# Patient Record
Sex: FEMALE | Race: WHITE | Employment: OTHER | URBAN - METROPOLITAN AREA
[De-identification: names, ages, dates, MRNs, and addresses within clinical notes are randomized per-mention and may not be internally consistent; named-entity substitution may affect disease eponyms.]

---

## 2017-01-19 DIAGNOSIS — I10 ESSENTIAL HYPERTENSION WITH GOAL BLOOD PRESSURE LESS THAN 140/90: Primary | ICD-10-CM

## 2017-01-19 DIAGNOSIS — F41.1 GENERALIZED ANXIETY DISORDER: ICD-10-CM

## 2017-01-19 RX ORDER — VALSARTAN AND HYDROCHLOROTHIAZIDE 160; 25 MG/1; MG/1
1 TABLET ORAL DAILY
Qty: 90 TABLET | Refills: 1 | Status: SHIPPED | OUTPATIENT
Start: 2017-01-19 | End: 2017-07-22

## 2017-01-19 RX ORDER — PAROXETINE 10 MG/1
10 TABLET, FILM COATED ORAL AT BEDTIME
Qty: 90 TABLET | Refills: 1 | Status: SHIPPED | OUTPATIENT
Start: 2017-01-19 | End: 2017-07-22

## 2017-01-19 RX ORDER — AMLODIPINE BESYLATE 5 MG/1
5 TABLET ORAL DAILY
Qty: 90 TABLET | Refills: 1 | Status: SHIPPED | OUTPATIENT
Start: 2017-01-19 | End: 2017-07-22

## 2017-01-20 DIAGNOSIS — F41.1 GENERALIZED ANXIETY DISORDER: Primary | ICD-10-CM

## 2017-02-02 ENCOUNTER — RADIANT APPOINTMENT (OUTPATIENT)
Dept: BONE DENSITY | Facility: CLINIC | Age: 72
End: 2017-02-02
Attending: FAMILY MEDICINE
Payer: COMMERCIAL

## 2017-02-02 DIAGNOSIS — Z13.820 SCREENING FOR OSTEOPOROSIS: ICD-10-CM

## 2017-02-02 PROCEDURE — 77080 DXA BONE DENSITY AXIAL: CPT | Performed by: INTERNAL MEDICINE

## 2017-02-08 ENCOUNTER — TRANSFERRED RECORDS (OUTPATIENT)
Dept: HEALTH INFORMATION MANAGEMENT | Facility: CLINIC | Age: 72
End: 2017-02-08

## 2017-03-28 ENCOUNTER — TELEPHONE (OUTPATIENT)
Dept: FAMILY MEDICINE | Facility: CLINIC | Age: 72
End: 2017-03-28

## 2017-03-28 NOTE — TELEPHONE ENCOUNTER
Panel Management Review      Patient has the following on her problem list: None      Composite cancer screening  Chart review shows that this patient is due/due soon for the following Mammogram  Summary:    Patient is due/failing the following:   BMP  and MAMMOGRAM    Action needed:   Patient needs office visit for HTN and mammogram .    Type of outreach:    [employer] message.    Questions for provider review:    None                                                                                                                                    Constance Boyd [facility]       Chart routed to Care Team .

## 2017-03-31 DIAGNOSIS — F41.1 GENERALIZED ANXIETY DISORDER: ICD-10-CM

## 2017-04-03 NOTE — TELEPHONE ENCOUNTER
Controlled Substance Refill Request for xanax  Problem List Complete:  No     PROVIDER TO CONSIDER COMPLETION OF PROBLEM LIST AND OVERVIEW/CONTROLLED SUBSTANCE AGREEMENT    Last Written Prescription Date:  7/12/16  Last Fill Quantity: 90,   # refills: 1    Last Office Visit with [place] primary care provider: 7/12/16    Future Office visit: none    Controlled substance agreement on file: No.      checked in past 6 months?  Yes 4/3/17 no concerns     Valery Guillen RN

## 2017-04-05 RX ORDER — ALPRAZOLAM 0.25 MG
TABLET ORAL
Qty: 90 TABLET | Refills: 1 | Status: SHIPPED | OUTPATIENT
Start: 2017-04-05 | End: 2018-07-05

## 2017-07-22 DIAGNOSIS — F41.1 GENERALIZED ANXIETY DISORDER: ICD-10-CM

## 2017-07-22 DIAGNOSIS — I10 ESSENTIAL HYPERTENSION WITH GOAL BLOOD PRESSURE LESS THAN 140/90: ICD-10-CM

## 2017-07-24 RX ORDER — VALSARTAN AND HYDROCHLOROTHIAZIDE 160; 25 MG/1; MG/1
TABLET ORAL
Qty: 30 TABLET | Refills: 0 | Status: SHIPPED | OUTPATIENT
Start: 2017-07-24 | End: 2017-08-24

## 2017-07-24 RX ORDER — PAROXETINE 10 MG/1
TABLET, FILM COATED ORAL
Qty: 30 TABLET | Refills: 0 | Status: SHIPPED | OUTPATIENT
Start: 2017-07-24 | End: 2017-08-24

## 2017-07-24 RX ORDER — AMLODIPINE BESYLATE 5 MG/1
TABLET ORAL
Qty: 30 TABLET | Refills: 0 | Status: SHIPPED | OUTPATIENT
Start: 2017-07-24 | End: 2017-08-24

## 2017-07-24 NOTE — TELEPHONE ENCOUNTER
Medication is being filled for 1 time refill only due to:  Patient needs to be seen because it has been more than one year since last visit.   Philomena Maria RN

## 2017-08-22 NOTE — PROGRESS NOTES
SUBJECTIVE:   Jenifer Baker is a 72 year old female who presents to clinic today for the following health issues:      Hypertension Follow-up      Outpatient blood pressures rarely , but it is all over the place     Low Salt Diet: no added salt    Hypertension ROS: taking medications as instructed, no medication side effects noted, no TIA's, no chest pain on exertion, no dyspnea on exertion, no swelling of ankles.  No headache or visual changes.     Anxiety Follow-Up    Status since last visit: No change    Other associated symptoms:struggles sleeping     Complicating factors:   Significant life event: No   Current substance abuse: None  Depression symptoms: No  ROSALVA-7 SCORE 4/28/2015 5/12/2015 8/24/2017   Total Score 7 3 -   Total Score - - 3       GAD7          Amount of exercise or physical activity: active     Problems taking medications regularly: No    Medication side effects: none  Diet: low salt          Problem list and histories reviewed & adjusted, as indicated.  Additional history: as documented    Patient Active Problem List   Diagnosis     Health Care Home     Advanced directives, counseling/discussion     Primary localized osteoarthrosis of lower leg     Generalized anxiety disorder     Hypertension goal BP (blood pressure) < 140/90     CARDIOVASCULAR SCREENING; LDL GOAL LESS THAN 160     Past Surgical History:   Procedure Laterality Date     APPENDECTOMY  1973     CHOLECYSTECTOMY  1973     COLONOSCOPY  2015     GALLBLADDER SURGERY       ORTHOPEDIC SURGERY  Knee replacement    Nov. 2015     TUBAL LIGATION         Social History   Substance Use Topics     Smoking status: Never Smoker     Smokeless tobacco: Never Used     Alcohol use Yes      Comment: Wine...on occasion     Family History   Problem Relation Age of Onset     CANCER Mother 89     Utreine cancer     Hypertension Mother      CEREBROVASCULAR DISEASE Mother      Other Cancer Mother      Depression Mother      Anxiety Disorder Mother       Arthritis Father      CANCER Father      Lung cancer     Other Cancer Father          Current Outpatient Prescriptions   Medication Sig Dispense Refill     amLODIPine (NORVASC) 5 MG tablet Take 1 tablet (5 mg) by mouth daily 90 tablet 1     PARoxetine (PAXIL) 10 MG tablet Take 1 tablet (10 mg) by mouth At Bedtime 90 tablet 1     valsartan-hydrochlorothiazide (DIOVAN-HCT) 160-25 MG per tablet Take 1 tablet by mouth daily 90 tablet 1     ALPRAZolam (XANAX) 0.25 MG tablet TAKE 1 TABLET BY MOUTH 3 TIMES DAILY AS NEEDED 90 tablet 1     multivitamin, therapeutic with minerals (MULTI-VITAMIN) TABS Take 1 tablet by mouth daily       vitamin E 400 UNIT capsule Take by mouth daily       Glucosamine-Chondroit-Vit C-Mn (GLUCOSAMINE CHONDR 1500 COMPLX PO) Take 1 tablet by mouth 2 times daily       Vitamin D, Cholecalciferol, 1000 UNITS CAPS Take 1 capsule by mouth daily       aspirin 81 MG tablet Take by mouth daily       Calcium-Magnesium-Vitamin D 600-300-100 LIQD        B Complex Vitamins (B COMPLEX PO) Take by mouth daily       omega-3 fatty acids (FISH OIL) 1200 MG capsule Take 1 capsule by mouth daily       Recent Labs   Lab Test  08/24/17   0943  07/12/16   1235   08/16/13   1034  03/15/11   LDL   --    --    --   129   --   115   HDL   --    --    --   69   --   64   TRIG   --    --    --   79   --   82   ALT   --    --    --   26   --    --    CR  0.80  0.68   < >  0.72   < >   --    GFRESTIMATED  71  85   < >  81   < >  >60   GFRESTBLACK  85  >90   GFR Calc     < >  >90   < >  >60   POTASSIUM  3.8  3.6   < >  3.8   < >  4.5   TSH  3.20   --    --   2.43   < >   --     < > = values in this interval not displayed.      BP Readings from Last 3 Encounters:   08/24/17 126/73   07/12/16 124/74   11/10/15 124/62    Wt Readings from Last 3 Encounters:   08/24/17 158 lb (71.7 kg)   07/12/16 159 lb (72.1 kg)   11/10/15 161 lb (73 kg)                          Reviewed and updated as needed this visit by clinical  "[facility]  Allergies  Meds  Problems       Reviewed and updated as needed this visit by Provider  Allergies  Meds  Problems         ROS:  Constitutional, HEENT, cardiovascular, pulmonary, gi and gu systems are negative, except as otherwise noted.      OBJECTIVE:   /73  Pulse 78  Temp 97.8  F (36.6  C) (Oral)  Ht 5' 3.5\" (1.613 m)  Wt 158 lb (71.7 kg)  BMI 27.55 kg/m2  Body mass index is 27.55 kg/(m^2).  GENERAL: healthy, alert and no distress  EYES: Eyes grossly normal to inspection, PERRL and conjunctivae and sclerae normal  NECK: no adenopathy, no asymmetry, masses, or scars and thyroid normal to palpation  RESP: lungs clear to auscultation - no rales, rhonchi or wheezes  CV: regular rate and rhythm, normal S1 S2, no S3 or S4, no murmur, click or rub, no peripheral edema and peripheral pulses strong  MS: no gross musculoskeletal defects noted, no edema  SKIN: no suspicious lesions or rashes  PSYCH: mentation appears normal, affect normal/bright    Diagnostic Test Results:  none     ASSESSMENT/PLAN:     (I10) Essential hypertension with goal blood pressure less than 140/90  (primary encounter diagnosis)  Comment: to goal  Plan: Basic metabolic panel  (Ca, Cl, CO2, Creat,         Gluc, K, Na, BUN), amLODIPine (NORVASC) 5 MG         tablet, valsartan-hydrochlorothiazide         (DIOVAN-HCT) 160-25 MG per tablet         Refill x 6 months f/u 6 months for OV and labs for wellness exam    (F41.1) Generalized anxiety disorder  Comment: doing well  Plan: PARoxetine (PAXIL) 10 MG tablet         Refill x 6 months see above for f/u     (R53.83) Fatigue, unspecified type  Comment: unclear etiology  Plan: Vitamin D Deficiency, Hemoglobin, TSH with free        T4 reflex        Recheck labs, treat if indicated    (Z23) Need for Tdap vaccination  Comment: due  Plan: TDAP VACCINE (ADACEL), ADMIN 1st VACCINE            (Z12.31) Encounter for screening mammogram for breast cancer  Comment: due  Plan: *MA " Screening Digital Bilateral              See Patient Instructions    Cira Schwab MD  [facility]

## 2017-08-24 ENCOUNTER — OFFICE VISIT (OUTPATIENT)
Dept: FAMILY MEDICINE | Facility: CLINIC | Age: 72
End: 2017-08-24
Payer: COMMERCIAL

## 2017-08-24 VITALS
HEART RATE: 78 BPM | BODY MASS INDEX: 26.98 KG/M2 | TEMPERATURE: 97.8 F | SYSTOLIC BLOOD PRESSURE: 126 MMHG | WEIGHT: 158 LBS | HEIGHT: 64 IN | DIASTOLIC BLOOD PRESSURE: 73 MMHG

## 2017-08-24 DIAGNOSIS — I10 ESSENTIAL HYPERTENSION WITH GOAL BLOOD PRESSURE LESS THAN 140/90: Primary | ICD-10-CM

## 2017-08-24 DIAGNOSIS — F41.1 GENERALIZED ANXIETY DISORDER: ICD-10-CM

## 2017-08-24 DIAGNOSIS — R53.83 FATIGUE, UNSPECIFIED TYPE: ICD-10-CM

## 2017-08-24 DIAGNOSIS — Z23 NEED FOR TDAP VACCINATION: ICD-10-CM

## 2017-08-24 DIAGNOSIS — Z12.31 ENCOUNTER FOR SCREENING MAMMOGRAM FOR BREAST CANCER: ICD-10-CM

## 2017-08-24 LAB
ANION GAP SERPL CALCULATED.3IONS-SCNC: 7 MMOL/L (ref 3–14)
BUN SERPL-MCNC: 13 MG/DL (ref 7–30)
CALCIUM SERPL-MCNC: 9.2 MG/DL (ref 8.5–10.1)
CHLORIDE SERPL-SCNC: 102 MMOL/L (ref 94–109)
CO2 SERPL-SCNC: 30 MMOL/L (ref 20–32)
CREAT SERPL-MCNC: 0.8 MG/DL (ref 0.52–1.04)
DEPRECATED CALCIDIOL+CALCIFEROL SERPL-MC: 38 UG/L (ref 20–75)
GFR SERPL CREATININE-BSD FRML MDRD: 71 ML/MIN/1.7M2
GLUCOSE SERPL-MCNC: 92 MG/DL (ref 70–99)
HGB BLD-MCNC: 13.2 G/DL (ref 11.7–15.7)
POTASSIUM SERPL-SCNC: 3.8 MMOL/L (ref 3.4–5.3)
SODIUM SERPL-SCNC: 139 MMOL/L (ref 133–144)
TSH SERPL DL<=0.005 MIU/L-ACNC: 3.2 MU/L (ref 0.4–4)

## 2017-08-24 PROCEDURE — 82306 VITAMIN D 25 HYDROXY: CPT | Performed by: FAMILY MEDICINE

## 2017-08-24 PROCEDURE — 99214 OFFICE O/P EST MOD 30 MIN: CPT | Mod: 25 | Performed by: FAMILY MEDICINE

## 2017-08-24 PROCEDURE — 84443 ASSAY THYROID STIM HORMONE: CPT | Performed by: FAMILY MEDICINE

## 2017-08-24 PROCEDURE — 90715 TDAP VACCINE 7 YRS/> IM: CPT | Performed by: FAMILY MEDICINE

## 2017-08-24 PROCEDURE — 85018 HEMOGLOBIN: CPT | Performed by: FAMILY MEDICINE

## 2017-08-24 PROCEDURE — 36415 COLL VENOUS BLD VENIPUNCTURE: CPT | Performed by: FAMILY MEDICINE

## 2017-08-24 PROCEDURE — 90471 IMMUNIZATION ADMIN: CPT | Performed by: FAMILY MEDICINE

## 2017-08-24 PROCEDURE — 80048 BASIC METABOLIC PNL TOTAL CA: CPT | Performed by: FAMILY MEDICINE

## 2017-08-24 RX ORDER — VALSARTAN AND HYDROCHLOROTHIAZIDE 160; 25 MG/1; MG/1
1 TABLET ORAL DAILY
Qty: 90 TABLET | Refills: 1 | Status: SHIPPED | OUTPATIENT
Start: 2017-08-24 | End: 2018-02-25

## 2017-08-24 RX ORDER — AMLODIPINE BESYLATE 5 MG/1
5 TABLET ORAL DAILY
Qty: 90 TABLET | Refills: 1 | Status: SHIPPED | OUTPATIENT
Start: 2017-08-24 | End: 2018-02-25

## 2017-08-24 RX ORDER — PAROXETINE 10 MG/1
10 TABLET, FILM COATED ORAL AT BEDTIME
Qty: 90 TABLET | Refills: 1 | Status: SHIPPED | OUTPATIENT
Start: 2017-08-24 | End: 2018-02-25

## 2017-08-24 ASSESSMENT — ANXIETY QUESTIONNAIRES
2. NOT BEING ABLE TO STOP OR CONTROL WORRYING: NOT AT ALL
3. WORRYING TOO MUCH ABOUT DIFFERENT THINGS: SEVERAL DAYS
7. FEELING AFRAID AS IF SOMETHING AWFUL MIGHT HAPPEN: NOT AT ALL
1. FEELING NERVOUS, ANXIOUS, OR ON EDGE: SEVERAL DAYS
GAD7 TOTAL SCORE: 3
6. BECOMING EASILY ANNOYED OR IRRITABLE: SEVERAL DAYS
IF YOU CHECKED OFF ANY PROBLEMS ON THIS QUESTIONNAIRE, HOW DIFFICULT HAVE THESE PROBLEMS MADE IT FOR YOU TO DO YOUR WORK, TAKE CARE OF THINGS AT HOME, OR GET ALONG WITH OTHER PEOPLE: NOT DIFFICULT AT ALL
5. BEING SO RESTLESS THAT IT IS HARD TO SIT STILL: NOT AT ALL

## 2017-08-24 ASSESSMENT — PATIENT HEALTH QUESTIONNAIRE - PHQ9: 5. POOR APPETITE OR OVEREATING: NOT AT ALL

## 2017-08-24 NOTE — NURSING NOTE
"Chief Complaint   Patient presents with     Hypertension     Anxiety       Initial /73  Pulse 78  Temp 97.8  F (36.6  C) (Oral)  Ht 5' 3.5\" (1.613 m)  Wt 158 lb (71.7 kg)  BMI 27.55 kg/m2 Estimated body mass index is 27.55 kg/(m^2) as calculated from the following:    Height as of this encounter: 5' 3.5\" (1.613 m).    Weight as of this encounter: 158 lb (71.7 kg).  Medication Reconciliation: complete  Constance Boyd , WALI     "

## 2017-08-24 NOTE — MR AVS SNAPSHOT
After Visit Summary   8/24/2017    Jenifer Baker    MRN: [id number]           Patient Information     Date Of Birth          1945        Visit Information        Provider Department      8/24/2017 8:55 AM Cira Schwab MD [facility]        Today's Diagnoses     Essential hypertension with goal blood pressure less than 140/90    -  1    Generalized anxiety disorder        Fatigue, unspecified type        Need for Tdap vaccination        Encounter for screening mammogram for breast cancer          Care Instructions      Next visit in 6 months for wellness exam and labs/refills.      Don't forget to get a flu shot.          Follow-ups after your visit        Future tests that were ordered for you today     Open Future Orders        Priority Expected Expires Ordered    *MA Screening Digital Bilateral Routine  8/22/2018 8/24/2017            Who to contact     If you have questions or need follow up information about today's clinic visit or your schedule please contact [facility] directly at [phone].  Normal or non-critical lab and imaging results will be communicated to you by MyChart, letter or phone within 4 business days after the clinic has received the results. If you do not hear from us within 7 days, please contact the clinic through [employer] or phone. If you have a critical or abnormal lab result, we will notify you by phone as soon as possible.  Submit refill requests through [employer] or call your pharmacy and they will forward the refill request to us. Please allow 3 business days for your refill to be completed.          Additional Information About Your Visit        MyChart Information     [employer] gives you secure access to your electronic health record. If you see a primary care provider, you can also send messages to your care team and make appointments. If you have questions, please call your primary care clinic.  If you do not have a primary care  "provider, please call [phone] and they will assist you.        Care EveryWhere ID     This is your Care EveryWhere ID. This could be used by other organizations to access your [place] medical records  [id number]        Your Vitals Were     Pulse Temperature Height BMI (Body Mass Index)          78 97.8  F (36.6  C) (Oral) 5' 3.5\" (1.613 m) 27.55 kg/m2         Blood Pressure from Last 3 Encounters:   08/24/17 126/73   07/12/16 124/74   11/10/15 124/62    Weight from Last 3 Encounters:   08/24/17 158 lb (71.7 kg)   07/12/16 159 lb (72.1 kg)   11/10/15 161 lb (73 kg)              We Performed the Following     ADMIN 1st VACCINE     Basic metabolic panel  (Ca, Cl, CO2, Creat, Gluc, K, Na, BUN)     Hemoglobin     TDAP VACCINE (ADACEL)     TSH with free T4 reflex     Vitamin D Deficiency          Today's Medication Changes          These changes are accurate as of: 8/24/17  9:36 AM.  If you have any questions, ask your nurse or doctor.               These medicines have changed or have updated prescriptions.        Dose/Directions    amLODIPine 5 MG tablet   Commonly known as:  NORVASC   This may have changed:  See the new instructions.   Used for:  Essential hypertension with goal blood pressure less than 140/90   Changed by:  Cira Schwab MD        Dose:  5 mg   Take 1 tablet (5 mg) by mouth daily   Quantity:  90 tablet   Refills:  1       PARoxetine 10 MG tablet   Commonly known as:  PAXIL   This may have changed:  See the new instructions.   Used for:  Generalized anxiety disorder   Changed by:  Cira Schwab MD        Dose:  10 mg   Take 1 tablet (10 mg) by mouth At Bedtime   Quantity:  90 tablet   Refills:  1       valsartan-hydrochlorothiazide 160-25 MG per tablet   Commonly known as:  DIOVAN-HCT   This may have changed:  See the new instructions.   Used for:  Essential hypertension with goal blood pressure less than 140/90   Changed by:  Cira Schwab MD        Dose:  1 tablet "   Take 1 tablet by mouth daily   Quantity:  90 tablet   Refills:  1            Where to get your medicines      These medications were sent to [facility]/pharmacy #[id number] - [place], [place] - [address],  [address] [facility]  [address], , [facility] [address]     Phone:  [phone]     amLODIPine 5 MG tablet    PARoxetine 10 MG tablet    valsartan-hydrochlorothiazide 160-25 MG per tablet                Primary Care Provider Office Phone # Fax #    Cira Schwab [phone] [address] [address]        Equal Access to Services     [facility]: Hadii aad martita hadasho Soomaali, waaxda luqadaha, qaybta kaalmada adeegyada, waxlai johnson haydwayne diaz . So [facility] [phone].    ATENCIÓN: Si habla español, tiene a vieira disposición servicios gratuitos de asistencia lingüística. [facility] [phone].    We comply with applicable federal civil rights laws and [place] laws. We do not discriminate on the basis of race, color, national origin, age, disability sex, sexual orientation or gender identity.            Thank you!     Thank you for choosing [facility]  for your care. Our goal is always to provide you with excellent care. Hearing back from our patients is one way we can continue to improve our services. Please take a few minutes to complete the written survey that you may receive in the mail after your visit with us. Thank you!             Your Updated Medication List - Protect others around you: Learn how to safely use, store and throw away your medicines at www.disposemymeds.org.          This list is accurate as of: 8/24/17  9:36 AM.  Always use your most recent med list.                   Brand Name Dispense Instructions for use Diagnosis    ALPRAZolam 0.25 MG tablet    XANAX    90 tablet    TAKE 1 TABLET BY MOUTH 3 TIMES DAILY AS NEEDED    Generalized anxiety disorder       amLODIPine 5 MG tablet    NORVASC    90  tablet    Take 1 tablet (5 mg) by mouth daily    Essential hypertension with goal blood pressure less than 140/90       aspirin 81 MG tablet      Take by mouth daily    Irregular heart rhythm       B COMPLEX PO      Take by mouth daily    Irregular heart rhythm       Calcium-Magnesium-Vitamin D 600-300-100 Liqd       Vitamin D insufficiency       GLUCOSAMINE CHONDR 1500 COMPLX PO      Take 1 tablet by mouth 2 times daily        Multi-vitamin Tabs tablet      Take 1 tablet by mouth daily        omega-3 fatty acids 1200 MG capsule      Take 1 capsule by mouth daily    CARDIOVASCULAR SCREENING; LDL GOAL LESS THAN 160       PARoxetine 10 MG tablet    PAXIL    90 tablet    Take 1 tablet (10 mg) by mouth At Bedtime    Generalized anxiety disorder       valsartan-hydrochlorothiazide 160-25 MG per tablet    DIOVAN-HCT    90 tablet    Take 1 tablet by mouth daily    Essential hypertension with goal blood pressure less than 140/90       Vitamin D (Cholecalciferol) 1000 UNITS Caps      Take 1 capsule by mouth daily        vitamin E 400 UNIT capsule      Take by mouth daily

## 2017-08-25 ASSESSMENT — ANXIETY QUESTIONNAIRES: GAD7 TOTAL SCORE: 3

## 2017-09-22 ENCOUNTER — RADIANT APPOINTMENT (OUTPATIENT)
Dept: MAMMOGRAPHY | Facility: CLINIC | Age: 72
End: 2017-09-22
Attending: FAMILY MEDICINE
Payer: COMMERCIAL

## 2017-09-22 DIAGNOSIS — Z12.31 ENCOUNTER FOR SCREENING MAMMOGRAM FOR BREAST CANCER: ICD-10-CM

## 2017-09-22 PROCEDURE — G0202 SCR MAMMO BI INCL CAD: HCPCS | Mod: TC

## 2018-02-25 DIAGNOSIS — I10 ESSENTIAL HYPERTENSION WITH GOAL BLOOD PRESSURE LESS THAN 140/90: ICD-10-CM

## 2018-02-25 DIAGNOSIS — F41.1 GENERALIZED ANXIETY DISORDER: ICD-10-CM

## 2018-02-26 RX ORDER — AMLODIPINE BESYLATE 5 MG/1
TABLET ORAL
Qty: 90 TABLET | Refills: 1 | Status: SHIPPED | OUTPATIENT
Start: 2018-02-26 | End: 2018-09-01

## 2018-02-26 RX ORDER — PAROXETINE 10 MG/1
TABLET, FILM COATED ORAL
Qty: 90 TABLET | Refills: 1 | Status: SHIPPED | OUTPATIENT
Start: 2018-02-26 | End: 2018-09-01

## 2018-02-26 RX ORDER — VALSARTAN AND HYDROCHLOROTHIAZIDE 160; 25 MG/1; MG/1
TABLET ORAL
Qty: 90 TABLET | Refills: 1 | Status: SHIPPED | OUTPATIENT
Start: 2018-02-26 | End: 2018-08-11

## 2018-04-25 ENCOUNTER — TRANSFERRED RECORDS (OUTPATIENT)
Dept: HEALTH INFORMATION MANAGEMENT | Facility: CLINIC | Age: 73
End: 2018-04-25

## 2018-06-26 NOTE — PROGRESS NOTES
[facility]  [address] [address] [phone]  Dept: [phone]    PRE-OP EVALUATION:  Today's date: 7/3/2018    Jenifer Baker (: 1945) presents for pre-operative evaluation assessment as requested by Dr. Adorno.  She requires evaluation and anesthesia risk assessment prior to undergoing surgery/procedure for treatment of Degenerative arthritis  .    Fax number for surgical facility:   Primary Physician: Cira Schwab  Type of Anesthesia Anticipated: General    Patient has a Health Care Directive or Living Will:  YES     Preop Questions 2018   Who is doing your surgery? Kyree...ISAIO   What are you having done? knee replacement   Date of Surgery/Procedure: 18   Facility or Hospital where procedure/surgery will be performed: [facility]   1.  Do you have a history of Heart attack, stroke, stent, coronary bypass surgery, or other heart surgery? No   2.  Do you ever have any pain or discomfort in your chest? No   3.  Do you have a history of  Heart Failure? No   4.   Are you troubled by shortness of breath when:  walking on a level surface, or up a slight hill, or at night? No   5.  Do you currently have a cold, bronchitis or other respiratory infection? No   6.  Do you have a cough, shortness of breath, or wheezing? No   7.  Do you sometimes get pains in the calves of your legs when you walk? UNKNOWN - because of arthritis in left knee   8. Do you or anyone in your family have previous history of blood clots? No   9.  Do you or does anyone in your family have a serious bleeding problem such as prolonged bleeding following surgeries or cuts? No   10. Have you ever had problems with anemia or been told to take iron pills? No   11. Have you had any abnormal blood loss such as black, tarry or bloody stools, or abnormal vaginal bleeding? No   12. Have you ever had a blood transfusion? No   13. Have you or any of your relatives ever had problems with  anesthesia? No   14. Do you have sleep apnea, excessive snoring or daytime drowsiness? No   15. Do you have any prosthetic heart valves? No   16. Do you have prosthetic joints? YES - right TKA   17. Is there any chance that you may be pregnant? No         HPI:     HPI related to upcoming procedure: severe OA of left knee requiring TKA      See problem list for active medical problems.  Problems all longstanding and stable, except as noted/documented.  See ROS for pertinent symptoms related to these conditions.                                                                                                                                                          .  HYPERTENSION - Patient has longstanding history of HTN , currently denies any symptoms referable to elevated blood pressure. Specifically denies chest pain, palpitations, dyspnea, orthopnea, PND or peripheral edema. Blood pressure readings have been in normal range. Current medication regimen is as listed below. Patient denies any side effects of medication.                                                                                                                                                                                          .    MEDICAL HISTORY:     Patient Active Problem List    Diagnosis Date Noted     Advanced directives, counseling/discussion 08/15/2013     Priority: Medium     Advance Care Planning: Pt does not have ADV, Was giving more info. About ADV. Farzana Yanes MA             Primary localized osteoarthrosis of lower leg 08/15/2013     Priority: Medium     Diagnosis updated by automated process. Provider to review and confirm.       Generalized anxiety disorder 08/15/2013     Priority: Medium     Diagnosis updated by automated process. Provider to review and confirm.       Hypertension goal BP (blood pressure) < 140/90 08/15/2013     Priority: Medium     CARDIOVASCULAR SCREENING; LDL GOAL LESS THAN 160 08/15/2013     Priority: Medium      [facility] 02/12/2013     Priority: Ann Beyer) HENNY Cervantes,C--[phone]   A / [facility] Care Coordinator /                    DX V65.8 REPLACED WITH [address] [facility] CARE HOME (04/08/2013)        Past Medical History:   Diagnosis Date     Arthritis      Depressive disorder      Hypertension      NO ACTIVE PROBLEMS      Past Surgical History:   Procedure Laterality Date     APPENDECTOMY  1973     CHOLECYSTECTOMY  1973     COLONOSCOPY  2015     GALLBLADDER SURGERY       ORTHOPEDIC SURGERY  Knee replacement    Nov. 2015     TUBAL LIGATION       Current Outpatient Prescriptions   Medication Sig Dispense Refill     ALPRAZolam (XANAX) 0.25 MG tablet TAKE 1 TABLET BY MOUTH 3 TIMES DAILY AS NEEDED 90 tablet 1     amLODIPine (NORVASC) 5 MG tablet TAKE 1 TABLET (5 MG) BY MOUTH DAILY 90 tablet 1     aspirin 81 MG tablet Take by mouth daily       Calcium-Magnesium-Vitamin D 600-300-100 LIQD        multivitamin, therapeutic with minerals (MULTI-VITAMIN) TABS Take 1 tablet by mouth daily       NONFORMULARY 2 times daily Hemp oil       omega-3 fatty acids (FISH OIL) 1200 MG capsule Take 1 capsule by mouth daily       PARoxetine (PAXIL) 10 MG tablet TAKE 1 TABLET (10 MG) BY MOUTH AT BEDTIME 90 tablet 1     valsartan-hydrochlorothiazide (DIOVAN-HCT) 160-25 MG per tablet TAKE 1 TABLET BY MOUTH DAILY 90 tablet 1     Vitamin D, Cholecalciferol, 1000 UNITS CAPS Take 1 capsule by mouth daily       vitamin E 400 UNIT capsule Take by mouth daily       OTC products: None, except as noted above    No Known Allergies   Latex Allergy: NO    Social History   Substance Use Topics     Smoking status: Never Smoker     Smokeless tobacco: Never Used     Alcohol use Yes      Comment: Wine...on occasion     History   Drug Use No       REVIEW OF SYSTEMS:   Constitutional, neuro, ENT, endocrine, pulmonary, cardiac, gastrointestinal, genitourinary, musculoskeletal, integument and psychiatric systems  "are negative, except as otherwise noted.    EXAM:   /71  Pulse 72  Temp 98.5  F (36.9  C) (Oral)  Resp 16  Ht 5' 3.5\" (1.613 m)  Wt 158 lb 12.8 oz (72 kg)  SpO2 97%  BMI 27.69 kg/m2    GENERAL APPEARANCE: healthy, alert and no distress     EYES: EOMI, PERRL     HENT: ear canals and TM's normal and nose and mouth without ulcers or lesions     NECK: no adenopathy, no asymmetry, masses, or scars and thyroid normal to palpation     RESP: lungs clear to auscultation - no rales, rhonchi or wheezes     CV: regular rates and rhythm, normal S1 S2, no S3 or S4 and no murmur, click or rub     ABDOMEN:  soft, nontender, no HSM or masses and bowel sounds normal     MS: extremities normal- no gross deformities noted, no evidence of inflammation in joints, FROM in all extremities.     SKIN: no suspicious lesions or rashes     NEURO: Normal strength and tone, sensory exam grossly normal, mentation intact, speech normal and cranial nerves 2-12 intact     PSYCH: mentation appears normal. and affect normal/bright    DIAGNOSTICS:   EKG: appears normal, NSR, RSR', normal axis, normal intervals, no acute ST/T changes c/w ischemia, no LVH by voltage criteria, unchanged from previous tracings    Recent Labs   Lab Test  08/24/17   0943  07/12/16   1235  12/07/15   1638   HGB  13.2   --   11.3*   NA  139  138   --    POTASSIUM  3.8  3.6   --    CR  0.80  0.68   --         IMPRESSION:   Reason for surgery/procedure: severe OA of left knee requiring TKA    The proposed surgical procedure is considered INTERMEDIATE risk.    REVISED CARDIAC RISK INDEX  The patient has the following serious cardiovascular risks for perioperative complications such as (MI, PE, VFib and 3  AV Block):  No serious cardiac risks  INTERPRETATION: 0 risks: Class I (very low risk - 0.4% complication rate)    The patient has the following additional risks for perioperative complications:  No identified additional risks      ICD-10-CM    1. Preop general " physical exam Z01.818 EKG 12-lead complete w/read - Clinics     CBC with platelets     *UA reflex to Microscopic and Culture ([facility] [place] [facility] (except [place] and [place])   2. Primary osteoarthritis of left knee M17.12 CBC with platelets     *UA reflex to Microscopic and Culture ([facility] [place] [facility] (except [place] and [place])   3. Generalized anxiety disorder F41.1    4. Hypertension goal BP (blood pressure) < 140/90 I10 BASIC METABOLIC PANEL     Lipid panel reflex to direct LDL Fasting       RECOMMENDATIONS:     --Consult hospital rounder / IM to assist post-op medical management    --Patient is to take all scheduled medications on the day of surgery EXCEPT for modifications listed below.    ACE Inhibitor or Angiotensin Receptor Blocker (ARB) Use  Ace inhibitor or Angiotensin Receptor Blocker (ARB) and will continue this medication due to the higher risk of uncontrolled perioperative hypertension (e.g. neurosurgical procedure)      APPROVAL GIVEN to proceed with proposed procedure, without further diagnostic evaluation       Signed Electronically by: Cira Schwab MD    Copy of this evaluation report is provided to requesting physician.    [facility] Preop Guidelines    Revised Cardiac Risk Index

## 2018-07-03 ENCOUNTER — OFFICE VISIT (OUTPATIENT)
Dept: FAMILY MEDICINE | Facility: CLINIC | Age: 73
End: 2018-07-03
Payer: COMMERCIAL

## 2018-07-03 VITALS
RESPIRATION RATE: 16 BRPM | HEART RATE: 72 BPM | BODY MASS INDEX: 27.11 KG/M2 | WEIGHT: 158.8 LBS | HEIGHT: 64 IN | SYSTOLIC BLOOD PRESSURE: 126 MMHG | OXYGEN SATURATION: 97 % | TEMPERATURE: 98.5 F | DIASTOLIC BLOOD PRESSURE: 71 MMHG

## 2018-07-03 DIAGNOSIS — M17.12 PRIMARY OSTEOARTHRITIS OF LEFT KNEE: ICD-10-CM

## 2018-07-03 DIAGNOSIS — Z01.818 PREOP GENERAL PHYSICAL EXAM: Primary | ICD-10-CM

## 2018-07-03 DIAGNOSIS — I10 HYPERTENSION GOAL BP (BLOOD PRESSURE) < 140/90: ICD-10-CM

## 2018-07-03 DIAGNOSIS — F41.1 GENERALIZED ANXIETY DISORDER: ICD-10-CM

## 2018-07-03 LAB
ALBUMIN UR-MCNC: NEGATIVE MG/DL
ANION GAP SERPL CALCULATED.3IONS-SCNC: 6 MMOL/L (ref 3–14)
APPEARANCE UR: CLEAR
BILIRUB UR QL STRIP: NEGATIVE
BUN SERPL-MCNC: 19 MG/DL (ref 7–30)
CALCIUM SERPL-MCNC: 9 MG/DL (ref 8.5–10.1)
CHLORIDE SERPL-SCNC: 102 MMOL/L (ref 94–109)
CHOLEST SERPL-MCNC: 223 MG/DL
CO2 SERPL-SCNC: 31 MMOL/L (ref 20–32)
COLOR UR AUTO: YELLOW
CREAT SERPL-MCNC: 0.76 MG/DL (ref 0.52–1.04)
ERYTHROCYTE [DISTWIDTH] IN BLOOD BY AUTOMATED COUNT: 12.9 % (ref 10–15)
GFR SERPL CREATININE-BSD FRML MDRD: 75 ML/MIN/1.7M2
GLUCOSE SERPL-MCNC: 98 MG/DL (ref 70–99)
GLUCOSE UR STRIP-MCNC: NEGATIVE MG/DL
HCT VFR BLD AUTO: 38.5 % (ref 35–47)
HDLC SERPL-MCNC: 71 MG/DL
HGB BLD-MCNC: 12.9 G/DL (ref 11.7–15.7)
HGB UR QL STRIP: NEGATIVE
KETONES UR STRIP-MCNC: ABNORMAL MG/DL
LDLC SERPL CALC-MCNC: 134 MG/DL
LEUKOCYTE ESTERASE UR QL STRIP: NEGATIVE
MCH RBC QN AUTO: 31.9 PG (ref 26.5–33)
MCHC RBC AUTO-ENTMCNC: 33.5 G/DL (ref 31.5–36.5)
MCV RBC AUTO: 95 FL (ref 78–100)
NITRATE UR QL: NEGATIVE
NONHDLC SERPL-MCNC: 152 MG/DL
PH UR STRIP: 6 PH (ref 5–7)
PLATELET # BLD AUTO: 204 10E9/L (ref 150–450)
POTASSIUM SERPL-SCNC: 3.6 MMOL/L (ref 3.4–5.3)
RBC # BLD AUTO: 4.05 10E12/L (ref 3.8–5.2)
SODIUM SERPL-SCNC: 139 MMOL/L (ref 133–144)
SOURCE: ABNORMAL
SP GR UR STRIP: 1.02 (ref 1–1.03)
TRIGL SERPL-MCNC: 91 MG/DL
UROBILINOGEN UR STRIP-ACNC: 0.2 EU/DL (ref 0.2–1)
WBC # BLD AUTO: 4.8 10E9/L (ref 4–11)

## 2018-07-03 PROCEDURE — 81003 URINALYSIS AUTO W/O SCOPE: CPT | Performed by: FAMILY MEDICINE

## 2018-07-03 PROCEDURE — 80048 BASIC METABOLIC PNL TOTAL CA: CPT | Performed by: FAMILY MEDICINE

## 2018-07-03 PROCEDURE — 80061 LIPID PANEL: CPT | Performed by: FAMILY MEDICINE

## 2018-07-03 PROCEDURE — 93000 ELECTROCARDIOGRAM COMPLETE: CPT | Performed by: FAMILY MEDICINE

## 2018-07-03 PROCEDURE — 99214 OFFICE O/P EST MOD 30 MIN: CPT | Performed by: FAMILY MEDICINE

## 2018-07-03 PROCEDURE — 36415 COLL VENOUS BLD VENIPUNCTURE: CPT | Performed by: FAMILY MEDICINE

## 2018-07-03 PROCEDURE — 85027 COMPLETE CBC AUTOMATED: CPT | Performed by: FAMILY MEDICINE

## 2018-07-03 NOTE — LETTER
July 5, 2018    Jenifer Baker  [address]  [place] [address]            Dear Jenifer,    Your pre-operative labs are normal and have been faxed to the hospital.     Below is a copy of the results.  It was a pleasure to see you at your last appointment.    If you have any questions or concerns, please call myself or my nurse at [phone].    Sincerely,    Cira Schwab MD / suresh    Results for orders placed or performed in visit on 07/03/18   BASIC METABOLIC PANEL   Result Value Ref Range    Sodium 139 133 - 144 mmol/L    Potassium 3.6 3.4 - 5.3 mmol/L    Chloride 102 94 - 109 mmol/L    Carbon Dioxide 31 20 - 32 mmol/L    Anion Gap 6 3 - 14 mmol/L    Glucose 98 70 - 99 mg/dL    Urea Nitrogen 19 7 - 30 mg/dL    Creatinine 0.76 0.52 - 1.04 mg/dL    GFR Estimate 75 >60 mL/min/1.7m2    GFR Estimate If Black >90 >60 mL/min/1.7m2    Calcium 9.0 8.5 - 10.1 mg/dL   Lipid panel reflex to direct LDL Fasting   Result Value Ref Range    Cholesterol 223 (H) <200 mg/dL    Triglycerides 91 <150 mg/dL    HDL Cholesterol 71 >49 mg/dL    LDL Cholesterol Calculated 134 (H) <100 mg/dL    Non HDL Cholesterol 152 (H) <130 mg/dL   CBC with platelets   Result Value Ref Range    WBC 4.8 4.0 - 11.0 10e9/L    RBC Count 4.05 3.8 - 5.2 10e12/L    Hemoglobin 12.9 11.7 - 15.7 g/dL    Hematocrit 38.5 35.0 - 47.0 %    MCV 95 78 - 100 fl    MCH 31.9 26.5 - 33.0 pg    MCHC 33.5 31.5 - 36.5 g/dL    RDW 12.9 10.0 - 15.0 %    Platelet Count 204 150 - 450 10e9/L   *UA reflex to Microscopic and Culture ([facility] (except [place] and [place])   Result Value Ref Range    Color Urine Yellow     Appearance Urine Clear     Glucose Urine Negative NEG^Negative mg/dL    Bilirubin Urine Negative NEG^Negative    Ketones Urine Trace (A) NEG^Negative mg/dL    Specific Gravity Urine 1.020 1.003 - 1.035    Blood Urine Negative NEG^Negative    pH Urine 6.0 5.0 - 7.0 pH    Protein Albumin Urine Negative NEG^Negative mg/dL    Urobilinogen Urine  0.2 0.2 - 1.0 EU/dL    Nitrite Urine Negative NEG^Negative    Leukocyte Esterase Urine Negative NEG^Negative    Source Midstream Urine

## 2018-07-03 NOTE — NURSING NOTE
"Chief Complaint   Patient presents with     Pre-Op Exam     EKG     Health Maintenance     orders pended        Initial /71  Pulse 72  Temp 98.5  F (36.9  C) (Oral)  Resp 16  Ht 5' 3.5\" (1.613 m)  Wt 158 lb 12.8 oz (72 kg)  SpO2 97%  BMI 27.69 kg/m2 Estimated body mass index is 27.69 kg/(m^2) as calculated from the following:    Height as of this encounter: 5' 3.5\" (1.613 m).    Weight as of this encounter: 158 lb 12.8 oz (72 kg).  Medication Reconciliation: complete  Ruslan Bell CMA    "

## 2018-07-03 NOTE — MR AVS SNAPSHOT
After Visit Summary   7/3/2018    Jenifer Baker    MRN: [id number]           Patient Information     Date Of Birth          1945        Visit Information        Provider Department      7/3/2018 10:15 AM Cira Schwab MD [facility]        Today's Diagnoses     Preop general physical exam    -  1    Primary osteoarthritis of left knee        Generalized anxiety disorder        Hypertension goal BP (blood pressure) < 140/90          Care Instructions      Before Your Surgery      Call your surgeon if there is any change in your health. This includes signs of a cold or flu (such as a sore throat, runny nose, cough, rash or fever).    Do not smoke, drink alcohol or take over the counter medicine (unless your surgeon or primary care doctor tells you to) for the 24 hours before and after surgery.    If you take prescribed drugs: Follow your doctor s orders about which medicines to take and which to stop until after surgery.    Eating and drinking prior to surgery: follow the instructions from your surgeon    Take a shower or bath the night before surgery. Use the soap your surgeon gave you to gently clean your skin. If you do not have soap from your surgeon, use your regular soap. Do not shave or scrub the surgery site.  Wear clean pajamas and have clean sheets on your bed.           Follow-ups after your visit        Who to contact     If you have questions or need follow up information about today's clinic visit or your schedule please contact [facility] directly at [phone].  Normal or non-critical lab and imaging results will be communicated to you by MyChart, letter or phone within 4 business days after the clinic has received the results. If you do not hear from us within 7 days, please contact the clinic through MyChart or phone. If you have a critical or abnormal lab result, we will notify you by phone as soon as possible.  Submit refill requests through  "MyChart or call your pharmacy and they will forward the refill request to us. Please allow 3 business days for your refill to be completed.          Additional Information About Your Visit        MyChart Information     Playdemict gives you secure access to your electronic health record. If you see a primary care provider, you can also send messages to your care team and make appointments. If you have questions, please call your primary care clinic.  If you do not have a primary care provider, please call [phone] and they will assist you.        Care EveryWhere ID     This is your Care EveryWhere ID. This could be used by other organizations to access your [place] medical records  [id number]        Your Vitals Were     Pulse Temperature Respirations Height Pulse Oximetry BMI (Body Mass Index)    72 98.5  F (36.9  C) (Oral) 16 5' 3.5\" (1.613 m) 97% 27.69 kg/m2       Blood Pressure from Last 3 Encounters:   07/03/18 126/71   08/24/17 126/73   07/12/16 124/74    Weight from Last 3 Encounters:   07/03/18 158 lb 12.8 oz (72 kg)   08/24/17 158 lb (71.7 kg)   07/12/16 159 lb (72.1 kg)              We Performed the Following     *UA reflex to Microscopic and Culture ([place] [facility] (except [place] and [place])     BASIC METABOLIC PANEL     CBC with platelets     EKG 12-lead complete w/read - Clinics     Lipid panel reflex to direct LDL Fasting        Primary Care Provider Office Phone # Fax #    Cira Ariella Schwab [phone] [address] [address] [facility] [address]        Equal Access to Services     [facility]: Hadii aad ku hadasho Soomaali, waaxda luqadaha, qaybta kaalmada roberto carlos andrew . So [facility] [phone].    ATENCIÓN: Si habla español, tiene a vieira disposición servicios gratuitos de asistencia lingüística. Llame al [phone].    We comply with applicable federal civil rights laws and [place] laws. We do not discriminate on the " basis of race, color, national origin, age, disability, sex, sexual orientation, or gender identity.            Thank you!     Thank you for [facility]. Our goal is always to provide you with excellent care. Hearing back from our patients is one way we can continue to improve our services. Please take a few minutes to complete the written survey that you may receive in the mail after your visit with us. Thank you!             Your Updated Medication List - Protect others around you: Learn how to safely use, store and throw away your medicines at www.disposemymeds.org.          This list is accurate as of 7/3/18 10:52 AM.  Always use your most recent med list.                   Brand Name Dispense Instructions for use Diagnosis    ALPRAZolam 0.25 MG tablet    XANAX    90 tablet    TAKE 1 TABLET BY MOUTH 3 TIMES DAILY AS NEEDED    Generalized anxiety disorder       amLODIPine 5 MG tablet    NORVASC    90 tablet    TAKE 1 TABLET (5 MG) BY MOUTH DAILY    Essential hypertension with goal blood pressure less than 140/90       aspirin 81 MG tablet      Take by mouth daily    Irregular heart rhythm       Calcium-Magnesium-Vitamin D 600-300-100 Liqd       Vitamin D insufficiency       Multi-vitamin Tabs tablet      Take 1 tablet by mouth daily        NONFORMULARY      2 times daily Hemp oil        omega-3 fatty acids 1200 MG capsule      Take 1 capsule by mouth daily    CARDIOVASCULAR SCREENING; LDL GOAL LESS THAN 160       PARoxetine 10 MG tablet    PAXIL    90 tablet    TAKE 1 TABLET (10 MG) BY MOUTH AT BEDTIME    Generalized anxiety disorder       valsartan-hydrochlorothiazide 160-25 MG per tablet    DIOVAN-HCT    90 tablet    TAKE 1 TABLET BY MOUTH DAILY    Essential hypertension with goal blood pressure less than 140/90       Vitamin D (Cholecalciferol) 1000 units Caps      Take 1 capsule by mouth daily        vitamin E 400 UNIT capsule      Take by mouth daily

## 2018-07-05 DIAGNOSIS — F41.1 GENERALIZED ANXIETY DISORDER: ICD-10-CM

## 2018-07-05 NOTE — TELEPHONE ENCOUNTER
Controlled Substance Refill Request for Xanax 0.25 mg   Problem List Complete:  Yes   checked in past 3 months?  Yes :  Checked 7/5/2018 no concerns     Routing refill request to provider for review/approval because:  Drug not on the [facility] refill protocol Clarissa Peña R.N.

## 2018-07-06 RX ORDER — ALPRAZOLAM 0.25 MG
TABLET ORAL
Qty: 90 TABLET | Refills: 0 | Status: SHIPPED | OUTPATIENT
Start: 2018-07-06 | End: 2018-12-28

## 2018-07-06 NOTE — TELEPHONE ENCOUNTER
The requested prescription(s) has/have been approved and has/have been printed and signed. This note was forwarded to the patient care pool to contact the patient to arrange for the patient to obtain the signed prescription(s).  Jose Daniel Archuleta

## 2018-07-17 ENCOUNTER — TRANSFERRED RECORDS (OUTPATIENT)
Dept: HEALTH INFORMATION MANAGEMENT | Facility: CLINIC | Age: 73
End: 2018-07-17

## 2018-08-01 ENCOUNTER — TRANSFERRED RECORDS (OUTPATIENT)
Dept: HEALTH INFORMATION MANAGEMENT | Facility: CLINIC | Age: 73
End: 2018-08-01

## 2018-08-30 ENCOUNTER — MYC MEDICAL ADVICE (OUTPATIENT)
Dept: FAMILY MEDICINE | Facility: CLINIC | Age: 73
End: 2018-08-30

## 2018-08-31 ENCOUNTER — MYC MEDICAL ADVICE (OUTPATIENT)
Dept: FAMILY MEDICINE | Facility: CLINIC | Age: 73
End: 2018-08-31

## 2018-08-31 ENCOUNTER — E-VISIT (OUTPATIENT)
Dept: FAMILY MEDICINE | Facility: CLINIC | Age: 73
End: 2018-08-31
Payer: COMMERCIAL

## 2018-08-31 DIAGNOSIS — G47.00 INSOMNIA, UNSPECIFIED TYPE: Primary | ICD-10-CM

## 2018-08-31 PROCEDURE — 99444 ZZC PHYSICIAN ONLINE EVALUATION & MANAGEMENT SERVICE: CPT | Performed by: FAMILY MEDICINE

## 2018-08-31 NOTE — MR AVS SNAPSHOT
After Visit Summary   8/31/2018    Jenifer Baker    MRN: [id number]           Patient Information     Date Of Birth          1945        Visit Information        Provider Department      8/31/2018 9:38 AM Cira Schwab MD [facility] [place]        Today's Diagnoses     Insomnia, unspecified type    -  1       Follow-ups after your visit        Who to contact     If you have questions or need follow up information about today's clinic visit or your schedule please contact [facility] directly at [phone].  Normal or non-critical lab and imaging results will be communicated to you by EnhanceWorkshart, letter or phone within 4 business days after the clinic has received the results. If you do not hear from us within 7 days, please contact the clinic through Kabongot or phone. If you have a critical or abnormal lab result, we will notify you by phone as soon as possible.  Submit refill requests through Videum or call your pharmacy and they will forward the refill request to us. Please allow 3 business days for your refill to be completed.          Additional Information About Your Visit        MyChart Information     Videum gives you secure access to your electronic health record. If you see a primary care provider, you can also send messages to your care team and make appointments. If you have questions, please call your primary care clinic.  If you do not have a primary care provider, please call [phone] and they will assist you.        Care EveryWhere ID     This is your Care EveryWhere ID. This could be used by other organizations to access your [place] medical records  [id number]         Blood Pressure from Last 3 Encounters:   07/03/18 126/71   08/24/17 126/73   07/12/16 124/74    Weight from Last 3 Encounters:   07/03/18 158 lb 12.8 oz (72 kg)   08/24/17 158 lb (71.7 kg)   07/12/16 159 lb (72.1 kg)              Today, you had the following     No orders found  for display         Today's Medication Changes          These changes are accurate as of 8/31/18 11:59 PM.  If you have any questions, ask your nurse or doctor.               Start taking these medicines.        Dose/Directions    zolpidem 5 MG tablet   Commonly known as:  AMBIEN   Used for:  Insomnia, unspecified type   Started by:  Cira Schwab MD        Dose:  5 mg   Take 1 tablet (5 mg) by mouth nightly as needed for sleep   Quantity:  30 tablet   Refills:  1            Where to get your medicines      Some of these will need a paper prescription and others can be bought over the counter.  Ask your nurse if you have questions.     Bring a paper prescription for each of these medications     zolpidem 5 MG tablet                Primary Care Provider Office Phone # Fax #    Cira Schwab [phone] [address] [facility] [address]        Equal Access to Services     [facility] : Hadii susan anders hadasho Soomaali, waaxda luqadaha, qaybta kaalmada adeegyada, roberto carlos johnson haydwayne diaz . So [facility] [phone].    ATENCIÓN: Si habla español, tiene a vieira disposición servicios gratuitos de asistencia lingüística. [facility] [phone].    We comply with applicable federal civil rights laws and [place] laws. We do not discriminate on the basis of race, color, national origin, age, disability, sex, sexual orientation, or gender identity.            Thank you!     Thank you for choosing [facility]  for your care. Our goal is always to provide you with excellent care. Hearing back from our patients is one way we can continue to improve our services. Please take a few minutes to complete the written survey that you may receive in the mail after your visit with us. Thank you!             Your Updated Medication List - Protect others around you: Learn how to safely use, store and throw away your medicines at www.disposemymeds.org.          This list is  accurate as of 8/31/18 11:59 PM.  Always use your most recent med list.                   Brand Name Dispense Instructions for use Diagnosis    ALPRAZolam 0.25 MG tablet    XANAX    90 tablet    TAKE 1 TABLET 3 TIMES A DAY AS NEEDED    Generalized anxiety disorder       amLODIPine 5 MG tablet    NORVASC    90 tablet    TAKE 1 TABLET (5 MG) BY MOUTH DAILY    Essential hypertension with goal blood pressure less than 140/90       aspirin 81 MG tablet      Take by mouth daily    Irregular heart rhythm       Calcium-Magnesium-Vitamin D 600-300-100 Liqd       Vitamin D insufficiency       Multi-vitamin Tabs tablet      Take 1 tablet by mouth daily        NONFORMULARY      2 times daily Hemp oil        omega-3 fatty acids 1200 MG capsule      Take 1 capsule by mouth daily    CARDIOVASCULAR SCREENING; LDL GOAL LESS THAN 160       PARoxetine 10 MG tablet    PAXIL    90 tablet    TAKE 1 TABLET (10 MG) BY MOUTH AT BEDTIME    Generalized anxiety disorder       valsartan-hydrochlorothiazide 160-25 MG per tablet    DIOVAN-HCT    90 tablet    TAKE 1 TABLET BY MOUTH DAILY    Essential hypertension with goal blood pressure less than 140/90       Vitamin D (Cholecalciferol) 1000 units Caps      Take 1 capsule by mouth daily        vitamin E 400 UNIT capsule      Take by mouth daily        zolpidem 5 MG tablet    AMBIEN    30 tablet    Take 1 tablet (5 mg) by mouth nightly as needed for sleep    Insomnia, unspecified type

## 2018-09-01 DIAGNOSIS — I10 ESSENTIAL HYPERTENSION WITH GOAL BLOOD PRESSURE LESS THAN 140/90: ICD-10-CM

## 2018-09-01 DIAGNOSIS — F41.1 GENERALIZED ANXIETY DISORDER: ICD-10-CM

## 2018-09-04 RX ORDER — ZOLPIDEM TARTRATE 5 MG/1
5 TABLET ORAL
Qty: 30 TABLET | Refills: 1 | Status: SHIPPED | OUTPATIENT
Start: 2018-09-04 | End: 2019-10-08

## 2018-09-06 RX ORDER — AMLODIPINE BESYLATE 5 MG/1
TABLET ORAL
Qty: 90 TABLET | Refills: 1 | Status: SHIPPED | OUTPATIENT
Start: 2018-09-06 | End: 2019-02-28

## 2018-09-06 RX ORDER — PAROXETINE 10 MG/1
TABLET, FILM COATED ORAL
Qty: 90 TABLET | Refills: 1 | Status: SHIPPED | OUTPATIENT
Start: 2018-09-06 | End: 2019-02-28

## 2018-09-07 ENCOUNTER — TRANSFERRED RECORDS (OUTPATIENT)
Dept: HEALTH INFORMATION MANAGEMENT | Facility: CLINIC | Age: 73
End: 2018-09-07

## 2019-02-11 DIAGNOSIS — I10 ESSENTIAL HYPERTENSION WITH GOAL BLOOD PRESSURE LESS THAN 140/90: ICD-10-CM

## 2019-02-11 RX ORDER — VALSARTAN AND HYDROCHLOROTHIAZIDE 160; 25 MG/1; MG/1
TABLET ORAL
Qty: 90 TABLET | Refills: 1 | Status: SHIPPED | OUTPATIENT
Start: 2019-02-11 | End: 2019-08-14

## 2019-02-28 DIAGNOSIS — I10 ESSENTIAL HYPERTENSION WITH GOAL BLOOD PRESSURE LESS THAN 140/90: ICD-10-CM

## 2019-02-28 DIAGNOSIS — F41.1 GENERALIZED ANXIETY DISORDER: ICD-10-CM

## 2019-02-28 RX ORDER — PAROXETINE 10 MG/1
TABLET, FILM COATED ORAL
Qty: 90 TABLET | Refills: 1 | Status: SHIPPED | OUTPATIENT
Start: 2019-02-28 | End: 2019-08-31

## 2019-02-28 RX ORDER — AMLODIPINE BESYLATE 5 MG/1
TABLET ORAL
Qty: 90 TABLET | Refills: 1 | Status: SHIPPED | OUTPATIENT
Start: 2019-02-28 | End: 2019-08-31

## 2019-08-14 ENCOUNTER — TELEPHONE (OUTPATIENT)
Dept: FAMILY MEDICINE | Facility: CLINIC | Age: 74
End: 2019-08-14

## 2019-08-14 DIAGNOSIS — I10 ESSENTIAL HYPERTENSION WITH GOAL BLOOD PRESSURE LESS THAN 140/90: ICD-10-CM

## 2019-08-14 NOTE — TELEPHONE ENCOUNTER
"Requested Prescriptions   Pending Prescriptions Disp Refills     valsartan-hydrochlorothiazide (DIOVAN HCT) 160-25 MG tablet [Pharmacy Med Name: VALSARTAN-HCTZ 160-25 MG TAB] 90 tablet 1     Sig: TAKE 1 TABLET BY MOUTH DAILY       Angiotensin-II Receptors Failed - 8/14/2019  1:32 AM        Failed - Last blood pressure under 140/90 in past 12 months     BP Readings from Last 3 Encounters:   07/03/18 126/71   08/24/17 126/73   07/12/16 124/74                 Failed - Recent (12 mo) or future (30 days) visit within the authorizing provider's specialty     Patient had office visit in the last 12 months or has a visit in the next 30 days with authorizing provider or within the authorizing provider's specialty.  See \"Patient Info\" tab in inbasket, or \"Choose Columns\" in Meds & Orders section of the refill encounter.              Failed - Normal serum creatinine on file in past 12 months     Recent Labs   Lab Test 07/03/18  1100   CR 0.76             Failed - Normal serum potassium on file in past 12 months     Recent Labs   Lab Test 07/03/18  1100   POTASSIUM 3.6                 "

## 2019-08-19 RX ORDER — VALSARTAN AND HYDROCHLOROTHIAZIDE 160; 25 MG/1; MG/1
TABLET ORAL
Qty: 90 TABLET | Refills: 0 | Status: SHIPPED | OUTPATIENT
Start: 2019-08-19 | End: 2019-10-07

## 2019-08-19 NOTE — TELEPHONE ENCOUNTER
Prescription approved per [facility] Refill Protocol.  Leia Mueller RN

## 2019-08-31 DIAGNOSIS — I10 ESSENTIAL HYPERTENSION WITH GOAL BLOOD PRESSURE LESS THAN 140/90: ICD-10-CM

## 2019-08-31 DIAGNOSIS — F41.1 GENERALIZED ANXIETY DISORDER: ICD-10-CM

## 2019-09-04 RX ORDER — AMLODIPINE BESYLATE 5 MG/1
TABLET ORAL
Qty: 90 TABLET | Refills: 0 | Status: SHIPPED | OUTPATIENT
Start: 2019-09-04 | End: 2019-10-07

## 2019-09-04 RX ORDER — PAROXETINE 10 MG/1
TABLET, FILM COATED ORAL
Qty: 90 TABLET | Refills: 0 | Status: SHIPPED | OUTPATIENT
Start: 2019-09-04 | End: 2019-10-07

## 2019-09-23 ENCOUNTER — DOCUMENTATION ONLY (OUTPATIENT)
Dept: LAB | Facility: CLINIC | Age: 74
End: 2019-09-23

## 2019-09-23 DIAGNOSIS — Z13.6 CARDIOVASCULAR SCREENING; LDL GOAL LESS THAN 160: Primary | ICD-10-CM

## 2019-09-23 DIAGNOSIS — I10 HYPERTENSION GOAL BP (BLOOD PRESSURE) < 140/90: ICD-10-CM

## 2019-09-23 NOTE — PROGRESS NOTES
Patient has an upcoming previsit appointment on 9/27/2019. Please review pended orders and add additional orders if needed.     Thank you,   Jane Harris

## 2019-09-23 NOTE — PROGRESS NOTES
Please review and sign Pending Pre-visit Labs in [facility]. Labs 09/27/19 and Wellness Exam 10/08/19   Richelle ALEX

## 2019-09-30 DIAGNOSIS — Z13.6 CARDIOVASCULAR SCREENING; LDL GOAL LESS THAN 160: ICD-10-CM

## 2019-09-30 DIAGNOSIS — I10 HYPERTENSION GOAL BP (BLOOD PRESSURE) < 140/90: ICD-10-CM

## 2019-09-30 LAB
ANION GAP SERPL CALCULATED.3IONS-SCNC: 8 MMOL/L (ref 3–14)
BUN SERPL-MCNC: 17 MG/DL (ref 7–30)
CALCIUM SERPL-MCNC: 9.2 MG/DL (ref 8.5–10.1)
CHLORIDE SERPL-SCNC: 103 MMOL/L (ref 94–109)
CHOLEST SERPL-MCNC: 226 MG/DL
CO2 SERPL-SCNC: 28 MMOL/L (ref 20–32)
CREAT SERPL-MCNC: 0.74 MG/DL (ref 0.52–1.04)
GFR SERPL CREATININE-BSD FRML MDRD: 79 ML/MIN/{1.73_M2}
GLUCOSE SERPL-MCNC: 88 MG/DL (ref 70–99)
HDLC SERPL-MCNC: 69 MG/DL
LDLC SERPL CALC-MCNC: 139 MG/DL
NONHDLC SERPL-MCNC: 157 MG/DL
POTASSIUM SERPL-SCNC: 3.8 MMOL/L (ref 3.4–5.3)
SODIUM SERPL-SCNC: 139 MMOL/L (ref 133–144)
TRIGL SERPL-MCNC: 89 MG/DL

## 2019-09-30 PROCEDURE — 36415 COLL VENOUS BLD VENIPUNCTURE: CPT | Performed by: FAMILY MEDICINE

## 2019-09-30 PROCEDURE — 80048 BASIC METABOLIC PNL TOTAL CA: CPT | Performed by: FAMILY MEDICINE

## 2019-09-30 PROCEDURE — 80061 LIPID PANEL: CPT | Performed by: FAMILY MEDICINE

## 2019-09-30 NOTE — RESULT ENCOUNTER NOTE
Jenifer,  Here are your lab results.  We will discuss these at your upcoming visit.   See you soon.  Cira Schawb MD

## 2019-10-03 ENCOUNTER — HEALTH MAINTENANCE LETTER (OUTPATIENT)
Age: 74
End: 2019-10-03

## 2019-10-03 NOTE — PATIENT INSTRUCTIONS
Patient Education   Personalized Prevention Plan  You are due for the preventive services outlined below.  Your care team is available to assist you in scheduling these services.  If you have already completed any of these items, please share that information with your care team to update in your medical record.  Health Maintenance Due   Topic Date Due     Zoster (Shingles) Vaccine (2 of 3) 06/23/2015     Annual Wellness Visit  04/28/2016     Discuss Advance Care Planning  08/15/2018     PHQ-2  01/01/2019     FALL RISK ASSESSMENT  07/03/2019     Flu Vaccine (1) 09/01/2019     Mammogram  09/22/2019           Start atorvastatin for cholesterol    Increase amlodipine to 10 mg daily, return in 2 weeks for blood pressure check in our pharmacy.

## 2019-10-08 ENCOUNTER — OFFICE VISIT (OUTPATIENT)
Dept: FAMILY MEDICINE | Facility: CLINIC | Age: 74
End: 2019-10-08
Payer: COMMERCIAL

## 2019-10-08 VITALS
BODY MASS INDEX: 24.49 KG/M2 | HEART RATE: 69 BPM | TEMPERATURE: 98.3 F | WEIGHT: 161.6 LBS | DIASTOLIC BLOOD PRESSURE: 76 MMHG | HEIGHT: 68 IN | RESPIRATION RATE: 16 BRPM | SYSTOLIC BLOOD PRESSURE: 146 MMHG

## 2019-10-08 DIAGNOSIS — F41.1 GENERALIZED ANXIETY DISORDER: ICD-10-CM

## 2019-10-08 DIAGNOSIS — I10 ESSENTIAL HYPERTENSION WITH GOAL BLOOD PRESSURE LESS THAN 140/90: ICD-10-CM

## 2019-10-08 DIAGNOSIS — Z00.00 ENCOUNTER FOR MEDICARE ANNUAL WELLNESS EXAM: Primary | ICD-10-CM

## 2019-10-08 DIAGNOSIS — Z12.31 ENCOUNTER FOR SCREENING MAMMOGRAM FOR BREAST CANCER: ICD-10-CM

## 2019-10-08 DIAGNOSIS — G47.00 INSOMNIA, UNSPECIFIED TYPE: ICD-10-CM

## 2019-10-08 DIAGNOSIS — Z91.89 10 YEAR RISK OF MI OR STROKE 7.5% OR GREATER: ICD-10-CM

## 2019-10-08 PROCEDURE — G0008 ADMIN INFLUENZA VIRUS VAC: HCPCS | Performed by: FAMILY MEDICINE

## 2019-10-08 PROCEDURE — 99213 OFFICE O/P EST LOW 20 MIN: CPT | Mod: 25 | Performed by: FAMILY MEDICINE

## 2019-10-08 PROCEDURE — 99207 C PAF COMPLETED  NO CHARGE: CPT | Mod: 25 | Performed by: FAMILY MEDICINE

## 2019-10-08 PROCEDURE — 90662 IIV NO PRSV INCREASED AG IM: CPT | Performed by: FAMILY MEDICINE

## 2019-10-08 PROCEDURE — 99397 PER PM REEVAL EST PAT 65+ YR: CPT | Performed by: FAMILY MEDICINE

## 2019-10-08 RX ORDER — ATORVASTATIN CALCIUM 10 MG/1
10 TABLET, FILM COATED ORAL DAILY
Qty: 90 TABLET | Refills: 1 | Status: SHIPPED | OUTPATIENT
Start: 2019-10-08 | End: 2020-04-11

## 2019-10-08 RX ORDER — ZOLPIDEM TARTRATE 5 MG/1
5 TABLET ORAL
Qty: 30 TABLET | Refills: 1 | Status: CANCELLED | OUTPATIENT
Start: 2019-10-08

## 2019-10-08 RX ORDER — PAROXETINE 10 MG/1
10 TABLET, FILM COATED ORAL DAILY
Qty: 90 TABLET | Refills: 1 | Status: SHIPPED | OUTPATIENT
Start: 2019-10-08 | End: 2020-06-04

## 2019-10-08 RX ORDER — AMLODIPINE BESYLATE 10 MG/1
10 TABLET ORAL DAILY
Qty: 90 TABLET | Refills: 1 | Status: SHIPPED | OUTPATIENT
Start: 2019-10-08 | End: 2020-04-11

## 2019-10-08 RX ORDER — VALSARTAN AND HYDROCHLOROTHIAZIDE 160; 25 MG/1; MG/1
1 TABLET ORAL DAILY
Qty: 90 TABLET | Refills: 1 | Status: SHIPPED | OUTPATIENT
Start: 2019-10-08 | End: 2020-05-01

## 2019-10-08 RX ORDER — ALPRAZOLAM 0.25 MG
0.25 TABLET ORAL
Qty: 90 TABLET | Refills: 1 | Status: SHIPPED | OUTPATIENT
Start: 2019-10-08 | End: 2020-11-10

## 2019-10-08 ASSESSMENT — ENCOUNTER SYMPTOMS
FEVER: 0
DIARRHEA: 0
HEMATURIA: 0
CONSTIPATION: 0
HEMATOCHEZIA: 0
DIZZINESS: 0
EYE PAIN: 0
ABDOMINAL PAIN: 0
FREQUENCY: 0
COUGH: 0
NERVOUS/ANXIOUS: 0
CHILLS: 0

## 2019-10-08 ASSESSMENT — ANXIETY QUESTIONNAIRES
IF YOU CHECKED OFF ANY PROBLEMS ON THIS QUESTIONNAIRE, HOW DIFFICULT HAVE THESE PROBLEMS MADE IT FOR YOU TO DO YOUR WORK, TAKE CARE OF THINGS AT HOME, OR GET ALONG WITH OTHER PEOPLE: SOMEWHAT DIFFICULT
5. BEING SO RESTLESS THAT IT IS HARD TO SIT STILL: NOT AT ALL
GAD7 TOTAL SCORE: 6
6. BECOMING EASILY ANNOYED OR IRRITABLE: SEVERAL DAYS
2. NOT BEING ABLE TO STOP OR CONTROL WORRYING: SEVERAL DAYS
3. WORRYING TOO MUCH ABOUT DIFFERENT THINGS: SEVERAL DAYS
7. FEELING AFRAID AS IF SOMETHING AWFUL MIGHT HAPPEN: SEVERAL DAYS
1. FEELING NERVOUS, ANXIOUS, OR ON EDGE: SEVERAL DAYS

## 2019-10-08 ASSESSMENT — MIFFLIN-ST. JEOR: SCORE: 1281.51

## 2019-10-08 ASSESSMENT — ACTIVITIES OF DAILY LIVING (ADL): CURRENT_FUNCTION: NO ASSISTANCE NEEDED

## 2019-10-08 ASSESSMENT — PATIENT HEALTH QUESTIONNAIRE - PHQ9: 5. POOR APPETITE OR OVEREATING: SEVERAL DAYS

## 2019-10-08 NOTE — NURSING NOTE
"Chief Complaint   Patient presents with     Wellness Visit       Initial BP (!) 152/71   Pulse 69   Temp 98.3  F (36.8  C) (Oral)   Resp 16   Ht 1.727 m (5' 8\")   Wt 73.3 kg (161 lb 9.6 oz)   BMI 24.57 kg/m   Estimated body mass index is 24.57 kg/m  as calculated from the following:    Height as of this encounter: 1.727 m (5' 8\").    Weight as of this encounter: 73.3 kg (161 lb 9.6 oz).  Medication Reconciliation: complete  Ruslan Bell CMA    "

## 2019-10-08 NOTE — PROGRESS NOTES
G 3 P 1   No LMP recorded. (Menstrual status: Tubal ).     Fasting: {YES:490719}   Td:***       NO - age 65 - see link Cervical Cytology Screening Guidelines               Cholesterol:   Lab Results   Component Value Date    CHOL 226 2019     Lab Results   Component Value Date    HDL 69 2019     Lab Results   Component Value Date     2019     Lab Results   Component Value Date    TRIG 89 2019     Lab Results   Component Value Date    CHOLHDLRATIO 3.1 2013         MM/17 due  Dexa:       Flex/colo: 8/15      Seat Belt: {YES:207050}   Sunscreen use: {YES:727619}  Calcium Intake: ***   Health Care Directive: {YES:334781}  Sexually Active: No    Current contraception: none  History of abnormal Pap smear: No  Family history of colon/breast/ovarian cancer: No  Regular self breast exam: Yes  History of abnormal mammogram: No

## 2019-10-08 NOTE — PROGRESS NOTES
"SUBJECTIVE:   Jenifer Baker is a 74 year old female who presents for Preventive Visit.      Are you in the first 12 months of your Medicare coverage?  No    Healthy Habits:     In general, how would you rate your overall health?  Good    Frequency of exercise:  2-3 days/week    Duration of exercise:  15-30 minutes    Do you usually eat at least 4 servings of fruit and vegetables a day, include whole grains    & fiber and avoid regularly eating high fat or \"junk\" foods?  Yes    Taking medications regularly:  Yes    Medication side effects:  None    Ability to successfully perform activities of daily living:  No assistance needed    Home Safety:  No safety concerns identified    Hearing Impairment:  Difficulty understanding soft or whispered speech    In the past 6 months, have you been bothered by leaking of urine?  No    In general, how would you rate your overall mental or emotional health?  Good      PHQ-2 Total Score: 0    Additional concerns today:  No    Do you feel safe in your environment? Yes    Do you have a Health Care Directive? Yes: Patient states has Advance Directive and will bring in a copy to clinic.      Fall risk  Fallen 2 or more times in the past year?: No  Any fall with injury in the past year?: No    Cognitive Screening   1) Repeat 3 items (Leader, Season, Table)    2) Clock draw: NORMAL  3) 3 item recall: Recalls 3 objects  Results: NORMAL clock, 1-2 items recalled: COGNITIVE IMPAIRMENT LESS LIKELY    Mini-CogTM Copyright S Esteban. Licensed by the author for use in [facility]; reprinted with permission (samira@.[email or web address]). All rights reserved.      Do you have sleep apnea, excessive snoring or daytime drowsiness?: no    Reviewed and updated as needed this visit by clinical staff  Tobacco  Allergies  Meds  Problems  Med Hx  Surg Hx  Fam Hx  Soc Hx          Reviewed and updated as needed this visit by Provider  Tobacco  Allergies  Meds  Problems  Med Hx  Surg Hx  Fam Hx  "       Social History     Tobacco Use     Smoking status: Never Smoker     Smokeless tobacco: Never Used   Substance Use Topics     Alcohol use: Yes     Comment: Wine...on occasion         Alcohol Use 10/8/2019   Prescreen: >3 drinks/day or >7 drinks/week? No   Prescreen: >3 drinks/day or >7 drinks/week? -               Current providers sharing in care for this patient include:   Patient Care Team:  Cira Schwab MD as PCP - General (Family Practice)  Cira Schwab MD as Assigned PCP  Cristina Mora RN as     The following health maintenance items are reviewed in Epic and correct as of today:  Health Maintenance   Topic Date Due     ZOSTER IMMUNIZATION (2 of 3) 06/23/2015     MEDICARE ANNUAL WELLNESS VISIT  04/28/2016     PHQ-2  01/01/2019     FALL RISK ASSESSMENT  07/03/2019     INFLUENZA VACCINE (1) 09/01/2019     MAMMO SCREENING  09/22/2019     BMP  03/30/2020     LIPID  09/30/2024     ADVANCE CARE PLANNING  10/08/2024     COLONOSCOPY  08/05/2025     DTAP/TDAP/TD IMMUNIZATION (3 - Td) 08/24/2027     DEXA  Completed     HEPATITIS C SCREENING  Completed     PNEUMOCOCCAL IMMUNIZATION 65+ LOW/MEDIUM RISK  Completed     IPV IMMUNIZATION  Aged Out     MENINGITIS IMMUNIZATION  Aged Out     G 3 P 1   No LMP recorded. (Menstrual status: Tubal ).     Fasting: No   Td: tdap 8/17       NO - age 65 - see link Cervical Cytology Screening Guidelines               Cholesterol:   Lab Results   Component Value Date    CHOL 226 09/30/2019     Lab Results   Component Value Date    HDL 69 09/30/2019     Lab Results   Component Value Date     09/30/2019     Lab Results   Component Value Date    TRIG 89 09/30/2019     Lab Results   Component Value Date    CHOLHDLRATIO 3.1 08/16/2013     The 10-year ASCVD risk score (Eron LEWIS Jr., et al., 2013) is: 24.3%    Values used to calculate the score:      Age: 74 years      Sex: Female      Is Non- : No      Diabetic: No      Tobacco  smoker: No      Systolic Blood Pressure: 146 mmHg      Is BP treated: Yes      HDL Cholesterol: 69 mg/dL      Total Cholesterol: 226 mg/dL     MM/17 due  Dexa:       Flex/colo: 8/15      Seat Belt: Yes    Sunscreen use: Yes   Calcium Intake: adeq   Health Care Directive: No  Sexually Active: No    Current contraception: none  History of abnormal Pap smear: No  Family history of colon/breast/ovarian cancer: No  Regular self breast exam: Yes  History of abnormal mammogram: No        Labs reviewed in EPIC  BP Readings from Last 3 Encounters:   10/08/19 (!) 146/76   18 126/71   17 126/73    Wt Readings from Last 3 Encounters:   10/08/19 73.3 kg (161 lb 9.6 oz)   18 72 kg (158 lb 12.8 oz)   17 71.7 kg (158 lb)                  Patient Active Problem List   Diagnosis     Health Care Home     Advanced directives, counseling/discussion     Primary localized osteoarthrosis of lower leg     Generalized anxiety disorder     Hypertension goal BP (blood pressure) < 140/90     CARDIOVASCULAR SCREENING; LDL GOAL LESS THAN 160     Past Surgical History:   Procedure Laterality Date     APPENDECTOMY  1973     CHOLECYSTECTOMY  1973     COLONOSCOPY       GALLBLADDER SURGERY       JOINT REPLACEMENT  2018    left knee     JOINT REPLACEMENT      right knee     ORTHOPEDIC SURGERY  Knee replacement    2015     TUBAL LIGATION         Social History     Tobacco Use     Smoking status: Never Smoker     Smokeless tobacco: Never Used   Substance Use Topics     Alcohol use: Yes     Comment: Wine...on occasion     Family History   Problem Relation Age of Onset     Cancer Mother 89        Utreine cancer     Hypertension Mother      Cerebrovascular Disease Mother      Other Cancer Mother      Depression Mother      Anxiety Disorder Mother      Arthritis Father      Cancer Father         Lung cancer     Other Cancer Father          Current Outpatient Medications   Medication Sig Dispense Refill      "ALPRAZolam (XANAX) 0.25 MG tablet Take 1 tablet (0.25 mg) by mouth nightly as needed for anxiety 90 tablet 1     amLODIPine (NORVASC) 10 MG tablet Take 1 tablet (10 mg) by mouth daily 90 tablet 1     aspirin 81 MG tablet Take by mouth daily       atorvastatin (LIPITOR) 10 MG tablet Take 1 tablet (10 mg) by mouth daily 90 tablet 1     Calcium-Magnesium-Vitamin D 600-300-100 LIQD        multivitamin, therapeutic with minerals (MULTI-VITAMIN) TABS Take 1 tablet by mouth daily       NONFORMULARY 2 times daily Hemp oil       omega-3 fatty acids (FISH OIL) 1200 MG capsule Take 1 capsule by mouth daily       PARoxetine (PAXIL) 10 MG tablet Take 1 tablet (10 mg) by mouth daily 90 tablet 1     valsartan-hydrochlorothiazide (DIOVAN HCT) 160-25 MG tablet Take 1 tablet by mouth daily 90 tablet 1     Vitamin D, Cholecalciferol, 1000 UNITS CAPS Take 1 capsule by mouth daily       vitamin E 400 UNIT capsule Take by mouth daily       Pneumonia Vaccine:completed  Mammogram Screening: Mammogram Screening: Patient over age 50, mutual decision to screen reflected in health maintenance.  Shingrix: recommended    Review of Systems   Constitutional: Negative for chills and fever.   HENT: Negative for congestion and ear pain.    Eyes: Negative for pain.   Respiratory: Negative for cough.    Cardiovascular: Negative for chest pain.   Gastrointestinal: Negative for abdominal pain, constipation, diarrhea and hematochezia.   Genitourinary: Negative for frequency and hematuria.   Neurological: Negative for dizziness.   Psychiatric/Behavioral: The patient is not nervous/anxious.          OBJECTIVE:   BP (!) 146/76   Pulse 69   Temp 98.3  F (36.8  C) (Oral)   Resp 16   Ht 1.727 m (5' 8\")   Wt 73.3 kg (161 lb 9.6 oz)   BMI 24.57 kg/m   Estimated body mass index is 24.57 kg/m  as calculated from the following:    Height as of this encounter: 1.727 m (5' 8\").    Weight as of this encounter: 73.3 kg (161 lb 9.6 oz).  Physical Exam  GENERAL " APPEARANCE: healthy, alert and no distress  EYES: Eyes grossly normal to inspection, PERRL and conjunctivae and sclerae normal  HENT: ear canals and TM's normal, nose and mouth without ulcers or lesions, oropharynx clear and oral mucous membranes moist  NECK: no adenopathy, no asymmetry, masses, or scars and thyroid normal to palpation  RESP: lungs clear to auscultation - no rales, rhonchi or wheezes  BREAST: normal without masses, tenderness or nipple discharge and no palpable axillary masses or adenopathy  CV: regular rate and rhythm, normal S1 S2, no S3 or S4, no murmur, click or rub, no peripheral edema and peripheral pulses strong  ABDOMEN: soft, nontender, no hepatosplenomegaly, no masses and bowel sounds normal  MS: no musculoskeletal defects are noted and gait is age appropriate without ataxia  SKIN: no suspicious lesions or rashes  NEURO: Normal strength and tone, sensory exam grossly normal, mentation intact and speech normal  PSYCH: mentation appears normal and affect normal/bright    Diagnostic Test Results:  Labs reviewed in Epic    ASSESSMENT / PLAN:   (Z00.00) Encounter for Medicare annual wellness exam  (primary encounter diagnosis)  Comment: preventive needs reviewed   Plan: see orders in Epic.     (F41.1) Generalized anxiety disorder  (G47.00) Insomnia, unspecified type  Comment: increased due to spouse's health issues  Plan: PARoxetine (PAXIL) 10 MG tablet, ALPRAZolam         (XANAX) 0.25 MG tablet        Does not want to increase paxil  Refill x 6 months  Using xanax 1/2 tab at night occas for sleep    (I10) Essential hypertension with goal blood pressure less than 140/90  Comment: not to goal  Plan: amLODIPine (NORVASC) 10 MG tablet,         valsartan-hydrochlorothiazide (DIOVAN HCT)         160-25 MG tablet        Increase norvasc to 10 mg daily  F/u 2 wks for bp check in pharmacy      (Z12.31) Encounter for screening mammogram for breast cancer  Comment: due  Plan: MA SCREENING DIGITAL BILAT -  "Future  (s+30)            (Z91.89) 10 year risk of MI or stroke 7.5% or greater  Comment: elevated risk  Plan: atorvastatin (LIPITOR) 10 MG tablet        Start 10 mg daily, Discussed potential side effects and actions to take if they occur.       End of Life Planning:  Patient currently has an advanced directive: No.  I have verified the patient's ablity to prepare an advanced directive/make health care decisions.  Literature was provided to assist patient in preparing an advanced directive.    COUNSELING:  Reviewed preventive health counseling, as reflected in patient instructions  Special attention given to:       Regular exercise       Healthy diet/nutrition    Estimated body mass index is 24.57 kg/m  as calculated from the following:    Height as of this encounter: 1.727 m (5' 8\").    Weight as of this encounter: 73.3 kg (161 lb 9.6 oz).         reports that she has never smoked. She has never used smokeless tobacco.      Appropriate preventive services were discussed with this patient, including applicable screening as appropriate for cardiovascular disease, diabetes, osteopenia/osteoporosis, and glaucoma.  As appropriate for age/gender, discussed screening for colorectal cancer, prostate cancer, breast cancer, and cervical cancer. Checklist reviewing preventive services available has been given to the patient.    Reviewed patients plan of care and provided an AVS. The Basic Care Plan (routine screening as documented in Health Maintenance) for Jenifer meets the Care Plan requirement. This Care Plan has been established and reviewed with the Patient.    Counseling Resources:  ATP IV Guidelines  Pooled Cohorts Equation Calculator  Breast Cancer Risk Calculator  FRAX Risk Assessment  ICSI Preventive Guidelines  Dietary Guidelines for Americans, 2010  Code[employer]'s MyPlate  ASA Prophylaxis  Lung CA Screening    Cira Schwab MD  [facility]    Identified Health Risks:  "

## 2019-10-09 ASSESSMENT — ANXIETY QUESTIONNAIRES: GAD7 TOTAL SCORE: 6

## 2020-02-08 ENCOUNTER — HEALTH MAINTENANCE LETTER (OUTPATIENT)
Age: 75
End: 2020-02-08

## 2020-04-10 DIAGNOSIS — I10 ESSENTIAL HYPERTENSION WITH GOAL BLOOD PRESSURE LESS THAN 140/90: ICD-10-CM

## 2020-04-10 DIAGNOSIS — Z91.89 10 YEAR RISK OF MI OR STROKE 7.5% OR GREATER: ICD-10-CM

## 2020-04-10 NOTE — TELEPHONE ENCOUNTER
BP Readings from Last 6 Encounters:   10/08/19 (!) 146/76   07/03/18 126/71   08/24/17 126/73   07/12/16 124/74   11/10/15 124/62   05/12/15 120/81     Patient is due for an office visit.  Please advise, would you like patient to do a Virtual Visit for follow up?  If so, which one?   Leia Mueller RN

## 2020-04-10 NOTE — LETTER
April 13, 2020    Jenifer Baker  [address]  [place] [address]    Dear Jenifer,       We recently received a refill request for amLODIPine (NORVASC) and Atorvastatin (LIPITOR)  .  We have refilled this for one time 6 month supply only because you are due for a:    Wellness office visit and Lab appointment Due October 2020      Please schedule this lab appointment 4-5 days prior to the office visit.     Please call at your earliest convenience so that there will not be a delay with your future refills.          Thank you,   Your [facility] Team/mkr  [phone]

## 2020-04-11 RX ORDER — ATORVASTATIN CALCIUM 10 MG/1
TABLET, FILM COATED ORAL
Qty: 90 TABLET | Refills: 1 | Status: SHIPPED | OUTPATIENT
Start: 2020-04-11 | End: 2020-08-11

## 2020-04-11 RX ORDER — AMLODIPINE BESYLATE 10 MG/1
TABLET ORAL
Qty: 90 TABLET | Refills: 1 | Status: SHIPPED | OUTPATIENT
Start: 2020-04-11 | End: 2020-10-06

## 2020-05-01 DIAGNOSIS — I10 ESSENTIAL HYPERTENSION WITH GOAL BLOOD PRESSURE LESS THAN 140/90: ICD-10-CM

## 2020-05-01 RX ORDER — VALSARTAN AND HYDROCHLOROTHIAZIDE 160; 25 MG/1; MG/1
TABLET ORAL
Qty: 90 TABLET | Refills: 1 | Status: SHIPPED | OUTPATIENT
Start: 2020-05-01 | End: 2020-10-20

## 2020-05-01 NOTE — LETTER
May 1, 2020    Jenifer Baker  [address]  [place] [address]    Dear Jenifer,       We recently received a refill request for valsartan-hydrochlorothiazide (DIOVAN HCT) 160-25 MG tablet .  We have refilled this for a one time 90 day supply only because you are due for a:    Mammogram and blood pressure check(ancillary) office visit       Please call at your earliest convenience so that there will not be a delay with your future refills.          Thank you,   Your [facility] Team/  [phone]

## 2020-05-01 NOTE — TELEPHONE ENCOUNTER
Routing refill request to provider for review/approval because:  Elevated bp  BP Readings from Last 3 Encounters:   10/08/19 (!) 146/76   07/03/18 126/71   08/24/17 126/73     Marcelina BAKERN, RN

## 2020-05-01 NOTE — TELEPHONE ENCOUNTER
Med refilled,  Pt due for mammogram, consider getting that done when re-open next week.  Could do bp check (ancillary) if comes for mmg.

## 2020-06-04 DIAGNOSIS — F41.1 GENERALIZED ANXIETY DISORDER: ICD-10-CM

## 2020-06-04 RX ORDER — PAROXETINE 10 MG/1
TABLET, FILM COATED ORAL
Qty: 90 TABLET | Refills: 0 | Status: SHIPPED | OUTPATIENT
Start: 2020-06-04 | End: 2020-09-14

## 2020-06-10 ENCOUNTER — ANCILLARY PROCEDURE (OUTPATIENT)
Dept: GENERAL RADIOLOGY | Facility: CLINIC | Age: 75
End: 2020-06-10
Attending: FAMILY MEDICINE
Payer: COMMERCIAL

## 2020-06-10 ENCOUNTER — OFFICE VISIT (OUTPATIENT)
Dept: URGENT CARE | Facility: URGENT CARE | Age: 75
End: 2020-06-10
Payer: COMMERCIAL

## 2020-06-10 VITALS
TEMPERATURE: 99.1 F | HEART RATE: 88 BPM | WEIGHT: 161 LBS | OXYGEN SATURATION: 97 % | BODY MASS INDEX: 24.48 KG/M2 | SYSTOLIC BLOOD PRESSURE: 116 MMHG | RESPIRATION RATE: 16 BRPM | DIASTOLIC BLOOD PRESSURE: 78 MMHG

## 2020-06-10 DIAGNOSIS — M25.532 LEFT WRIST PAIN: Primary | ICD-10-CM

## 2020-06-10 PROCEDURE — 73110 X-RAY EXAM OF WRIST: CPT | Mod: LT

## 2020-06-10 PROCEDURE — 99214 OFFICE O/P EST MOD 30 MIN: CPT | Performed by: FAMILY MEDICINE

## 2020-06-10 NOTE — PROGRESS NOTES
Chief complaint: left wrist pain    Happened last night     Fell down about 10 cm  Fell with outstretched hand  Left wrist hurting  Swollen  Looks discolored    head injury:No  loss of consciousness:  No  syncope or presyncope: No  chest pain or palpitation: No  mechanical fall:  Yes  using assistive devices:  No  blood thinners: No   denies headache  denies any nausea or vomiting  denies any amnesia, confusion or concussion symptoms  denies any blurring of vision  denies any otorrhea or rhinorhea  denies any neck pain  denies any back pain.  denies any chest pain or shortness of breath  denies any joint pain except noted above.  denies any bowel or bladder incontinence or motor or sensory deficits.  denies any abdominal pain, nausea or vomiting or flank pain  denies any hematuria    No Known Allergies    Past Medical History:   Diagnosis Date     Arthritis      Depressive disorder      Hypertension      NO ACTIVE PROBLEMS        amLODIPine (NORVASC) 10 MG tablet, TAKE 1 TABLET BY MOUTH EVERY DAY  aspirin 81 MG tablet, Take by mouth daily  atorvastatin (LIPITOR) 10 MG tablet, TAKE 1 TABLET BY MOUTH EVERY DAY  Calcium-Magnesium-Vitamin D 600-300-100 LIQD,   multivitamin, therapeutic with minerals (MULTI-VITAMIN) TABS, Take 1 tablet by mouth daily  NONFORMULARY, 2 times daily Hemp oil  omega-3 fatty acids (FISH OIL) 1200 MG capsule, Take 1 capsule by mouth daily  PARoxetine (PAXIL) 10 MG tablet, TAKE 1 TABLET BY MOUTH EVERY DAY  valsartan-hydrochlorothiazide (DIOVAN HCT) 160-25 MG tablet, TAKE 1 TABLET BY MOUTH EVERY DAY  Vitamin D, Cholecalciferol, 1000 UNITS CAPS, Take 1 capsule by mouth daily  vitamin E 400 UNIT capsule, Take by mouth daily  ALPRAZolam (XANAX) 0.25 MG tablet, Take 1 tablet (0.25 mg) by mouth nightly as needed for anxiety (Patient not taking: Reported on 6/10/2020)    No current facility-administered medications on file prior to visit.       Social History     Tobacco Use     Smoking status: Never  Smoker     Smokeless tobacco: Never Used   Substance Use Topics     Alcohol use: Yes     Comment: Wine...on occasion     Drug use: No       ROS:  review of systems negative except for noted above.       OBJECTIVE:  /78   Pulse 88   Temp 99.1  F (37.3  C)   Resp 16   Wt 73 kg (161 lb)   SpO2 97%   BMI 24.48 kg/m     General:   awake, alert, and cooperative.  NAD.   Head: Normocephalic, atraumatic.  Eyes: Conjunctiva clear  Musculoskeletal: Affected extremity neurovascularly intact, no cyanosis or edema, good pulses and capillary refill.   Good range of motion   Tender left wrist scaphoid bone area and distal radius  No other bony pain or tenderness  PSYCH:  Normal affect, normal speech  SKIN: no obvious rashes    ASSESSMENT:    ICD-10-CM    1. Left wrist pain  M25.532 XR Wrist Left G/E 3 Views       PLAN:   xrays to my review negative for any acute fracture or dislocation  However given tender scaphoid cannot rule out occult scaphoid bone fracture  Recommend thumb spica wrist splint  Supportive treatment   Follow up with primary care provider in 10-14 days for re-evaluation. If persistent concerns then recommend repeat xrays.       Advised about symptoms which might herald more serious problems.        Chantel Mckeon MD

## 2020-06-19 DIAGNOSIS — F41.1 GENERALIZED ANXIETY DISORDER: ICD-10-CM

## 2020-06-19 RX ORDER — PAROXETINE 10 MG/1
TABLET, FILM COATED ORAL
Qty: 90 TABLET | Refills: 0 | OUTPATIENT
Start: 2020-06-19

## 2020-07-13 ENCOUNTER — VIRTUAL VISIT (OUTPATIENT)
Dept: FAMILY MEDICINE | Facility: OTHER | Age: 75
End: 2020-07-13

## 2020-07-13 NOTE — PROGRESS NOTES
"Date: 2020 10:23:58  Clinician: Andrea Hadley  Clinician NPI: [id number]  Patient: Jenifer Baker  Patient : 1945  Patient Address: [address] [place] [address]  Patient Phone: [phone]  Visit Protocol: URI  Patient Summary:  Jenifer is a 75 year old ( : 1945 ) female who initiated a Visit for COVID-19 (Coronavirus) evaluation and screening. When asked the question \"Please sign me up to receive news, health information and promotions from [employer].\", Jenifer responded \"Yes\".    Jenifer states her symptoms started gradually 5-6 days ago.   Her symptoms consist of diarrhea, chills, and myalgia.   Jenifer denies having wheezing, nausea, teeth pain, ageusia, vomiting, rhinitis, malaise, ear pain, headache, sore throat, enlarged lymph nodes, anosmia, facial pain or pressure, fever, cough, and nasal congestion. She also denies having recent facial or sinus surgery in the past 60 days, taking antibiotic medication in the past month, and double sickening (worsening symptoms after initial improvement). She is not experiencing dyspnea.   Precipitating events  She has not recently been exposed to someone with influenza. Jenifer has been in close contact with the following high risk individuals: children under the age of 5 and adults 65 or older.   Pertinent COVID-19 (Coronavirus) information  In the past 14 days, Jenifer has not worked in a congregate living setting.   She does not work or volunteer as healthcare worker or a  and does not work or volunteer in a healthcare facility.   Jenifer also has not lived in a congregate living setting in the past 14 days. She does not live with a healthcare worker.   Jenifer has not had a close contact with a laboratory-confirmed COVID-19 patient within 14 days of symptom onset.   Pertinent medical history  Jenifer does not get yeast infections when she takes antibiotics.   Jenifer does not need a return to work/school note.   Weight: 150 lbs   Jenifer does not smoke or " use smokeless tobacco.   Additional information as reported by the patient (free text): In past I have had problems with irritable bowel but usually can contain it with over the counter anti diarrhea med. Because of the covid saying that diarrhea may be a symptom and my age I now am wondering if I could possibly have it without other symptoms excepting for the diarrhea that I have encountered this past week.  It is on and off and I have been taking over the counter diarrhea medicine.   Weight: 150 lbs    MEDICATIONS: paroxetine oral, atorvastatin oral, amlodipine besylate (bulk), valsartan-hydrochlorothiazide oral, ALLERGIES: NKDA  Clinician Response:  Dear Jenifer,   Your symptoms show that you may have coronavirus (COVID-19). This illness can cause fever, cough and trouble breathing. Many people get a mild case and get better on their own. Some people can get very sick.  What should I do?  We would like to test you for this virus.   1. Please call [phone] to schedule your visit. Explain that you were referred by [facility] to have a COVID-19 test. Be ready to share your [facility] visit ID number.  The following will serve as your written order for this COVID Test, ordered by me, for the indication of suspected COVID [Z20.828]: The test will be ordered in [email or web address], our electronic health record, after you are scheduled. It will show as ordered and authorized by Jose Stevenson MD.  Order: COVID-19 (Coronavirus) PCR for SYMPTOMATIC testing from [facility].      2. When it's time for your COVID test:  Stay at least 6 feet away from others. (If someone will drive you to your test, stay in the backseat, as far away from the  as you can.)   Cover your mouth and nose with a mask, tissue or washcloth.  Go straight to the testing site. Don't make any stops on the way there or back.      3.Starting now: Stay home and away from others (self-isolate) until:   You've had no fever---and no medicine that reduces fever---for 3 full days (01  "hours). And...   Your other symptoms have gotten better. For example, your cough or breathing has improved. And...   At least 10 days have passed since your symptoms started.       During this time, don't leave the house except for testing or medical care.   Stay in your own room, even for meals. Use your own bathroom if you can.   Stay away from others in your home. No hugging, kissing or shaking hands. No visitors.  Don't go to work, school or anywhere else.    Clean \"high touch\" surfaces often (doorknobs, counters, handles, etc.). Use a household cleaning spray or wipes. You'll find a full list of  on the EPA website: www.epa.gov/pesticide-registration/list-n-disinfectants-use-against-sars-cov-2.   Cover your mouth and nose with a mask, tissue or washcloth to avoid spreading germs.  Wash your hands and face often. Use soap and water.  Caregivers in these groups are at risk for severe illness due to COVID-19:  o People 65 years and older  o People who live in a nursing home or long-term care facility  o People with chronic disease (lung, heart, cancer, diabetes, kidney, liver, immunologic)  o People who have a weakened immune system, including those who:   Are in cancer treatment  Take medicine that weakens the immune system, such as corticosteroids  Had a bone marrow or organ transplant  Have an immune deficiency  Have poorly controlled HIV or AIDS  Are obese (body mass index of 40 or higher)  Smoke regularly   o Caregivers should wear gloves while washing dishes, handling laundry and cleaning bedrooms and bathrooms.  o Use caution when washing and drying laundry: Don't shake dirty laundry, and use the warmest water setting that you can.  o For more tips, go to www.cdc.gov/coronavirus/2019-ncov/downloads/10Things.pdf.    4.Sign up for [employer]. We know it's scary to hear that you might have COVID-19. We want to track your symptoms to make sure you're okay over the next 2 weeks. Please look for an " email from [employer]---this is a free, online program that we'll use to keep in touch. To sign up, follow the link in the email. Learn more at [email or web address]  How can I take care of myself?   Get lots of rest. Drink extra fluids (unless a doctor has told you not to).   Take Tylenol (acetaminophen) for fever or pain. If you have liver or kidney problems, ask your family doctor if it's okay to take Tylenol.   Adults can take either:    650 mg (two 325 mg pills) every 4 to 6 hours, or...   1,000 mg (two 500 mg pills) every 8 hours as needed.    Note: Don't take more than 3,000 mg in one day. Acetaminophen is found in many medicines (both prescribed and over-the-counter medicines). Read all labels to be sure you don't take too much.   For children, check the Tylenol bottle for the right dose. The dose is based on the child's age or weight.    If you have other health problems (like cancer, heart failure, an organ transplant or severe kidney disease): Call your specialty clinic if you don't feel better in the next 2 days.       Know when to call 911. Emergency warning signs include:    Trouble breathing or shortness of breath Pain or pressure in the chest that doesn't go away Feeling confused like you haven't felt before, or not being able to wake up Bluish-colored lips or face.  Where can I get more information?   [facility] -- About COVID-19: [email or web address]   CDC -- What to Do If You're Sick: www.cdc.gov/coronavirus/2019-ncov/about/steps-when-sick.html   CDC -- Ending Home Isolation: www.cdc.gov/coronavirus/2019-ncov/hcp/disposition-in-home-patients.html   CDC -- Caring for Someone: www.cdc.gov/coronavirus/2019-ncov/if-you-are-sick/care-for-someone.html   [facility] -- Interim Guidance for Hospital Discharge to Home: www.health.[facility].[place].us/diseases/coronavirus/hcp/hospdischarge.pdf   [facility] clinical trials (COVID-19 research studies):  clinicalaffairs.[facility]-clinical-trials    Below are the COVID-19 hotlines at the [place] Department of Health ([facility]). Interpreters are available.    For health questions: Call 134-653-9660 or 1-569.128.2105 (7 a.m. to 7 p.m.) For questions about schools and childcare: Call 660-602-5255 or 1-564.703.3587 (7 a.m. to 7 p.m.)    Diagnosis: Diarrhea, unspecified  Diagnosis ICD: R19.7

## 2020-07-14 DIAGNOSIS — Z20.822 SUSPECTED COVID-19 VIRUS INFECTION: Primary | ICD-10-CM

## 2020-07-14 PROCEDURE — U0003 INFECTIOUS AGENT DETECTION BY NUCLEIC ACID (DNA OR RNA); SEVERE ACUTE RESPIRATORY SYNDROME CORONAVIRUS 2 (SARS-COV-2) (CORONAVIRUS DISEASE [COVID-19]), AMPLIFIED PROBE TECHNIQUE, MAKING USE OF HIGH THROUGHPUT TECHNOLOGIES AS DESCRIBED BY CMS-2020-01-R: HCPCS | Performed by: FAMILY MEDICINE

## 2020-07-14 NOTE — LETTER
July 19, 2020        Jenifer Baker  [address]  [place] [address]    This letter provides a written record that you were tested for COVID-19 on 7/14/2020.       Your result was negative. This means that we didn t find the virus that causes COVID-19 in your sample. A test may show negative when you do actually have the virus. This can happen when the virus is in the early stages of infection, before you feel illness symptoms.    If you have symptoms   Stay home and away from others (self-isolate) until you meet ALL of the guidelines below:    You ve had no fever--and no medicine that reduces fever--for 3 full days (72 hours). And      Your other symptoms have gotten better. For example, your cough or breathing has improved. And     At least 10 days have passed since your symptoms started.    During this time:    Stay home. Don t go to work, school or anywhere else.     Stay in your own room, including for meals. Use your own bathroom if you can.    Stay away from others in your home. No hugging, kissing or shaking hands. No visitors.    Clean  high touch  surfaces often (doorknobs, counters, handles, etc.). Use a household cleaning spray or wipes. You can find a full list on the EPA website at www.epa.gov/pesticide-registration/list-n-disinfectants-use-against-sars-cov-2.    Cover your mouth and nose with a mask, tissue or washcloth to avoid spreading germs.    Wash your hands and face often with soap and water.    Going back to work  Check with your employer for any guidelines to follow for going back to work.    Employers: This document serves as formal notice that your employee tested negative for COVID-19, as of the testing date shown above.

## 2020-07-15 LAB
SARS-COV-2 RNA SPEC QL NAA+PROBE: NOT DETECTED
SPECIMEN SOURCE: NORMAL

## 2020-08-11 DIAGNOSIS — Z91.89 10 YEAR RISK OF MI OR STROKE 7.5% OR GREATER: ICD-10-CM

## 2020-08-11 RX ORDER — ATORVASTATIN CALCIUM 10 MG/1
TABLET, FILM COATED ORAL
Qty: 90 TABLET | Refills: 0 | Status: SHIPPED | OUTPATIENT
Start: 2020-08-11 | End: 2020-11-10

## 2020-09-13 DIAGNOSIS — F41.1 GENERALIZED ANXIETY DISORDER: ICD-10-CM

## 2020-09-13 NOTE — LETTER
September 15, 2020    Jenifer Baker  [address]  [place] [address]    Dear Jenifer,       We recently received a refill request for PARoxetine (PAXIL) 10 MG tablet .  We have refilled this for a one time 90 day supply only because you are due for a:    Anxiety office visit and fasting lab appointment      Please schedule this lab appointment 4-5 days prior to the office visit.     Please call at your earliest convenience so that there will not be a delay with your future refills.          Thank you,   Your [facility] Team/  [phone]

## 2020-09-14 RX ORDER — PAROXETINE 10 MG/1
TABLET, FILM COATED ORAL
Qty: 90 TABLET | Refills: 0 | Status: SHIPPED | OUTPATIENT
Start: 2020-09-14 | End: 2020-11-10

## 2020-09-14 NOTE — TELEPHONE ENCOUNTER
Medication is being filled for 1 time refill only due to:  Patient needs to be seen for fasting lab appointment and appointment with the provider for further refills.  Marcelina BAKERN, RN

## 2020-10-05 DIAGNOSIS — I10 ESSENTIAL HYPERTENSION WITH GOAL BLOOD PRESSURE LESS THAN 140/90: ICD-10-CM

## 2020-10-06 RX ORDER — AMLODIPINE BESYLATE 10 MG/1
TABLET ORAL
Qty: 90 TABLET | Refills: 0 | Status: SHIPPED | OUTPATIENT
Start: 2020-10-06 | End: 2020-11-10

## 2020-10-06 NOTE — TELEPHONE ENCOUNTER
Next 5 appointments (look out 90 days)    Nov 10, 2020  9:15 AM  Office Visit with Cira Schwab MD  [facility] ([facility]) [address] [facility] [address] [phone]        Rx refilled.    Valery Guillen BSN, RN

## 2020-10-20 DIAGNOSIS — I10 ESSENTIAL HYPERTENSION WITH GOAL BLOOD PRESSURE LESS THAN 140/90: ICD-10-CM

## 2020-10-20 RX ORDER — VALSARTAN AND HYDROCHLOROTHIAZIDE 160; 25 MG/1; MG/1
TABLET ORAL
Qty: 90 TABLET | Refills: 0 | Status: SHIPPED | OUTPATIENT
Start: 2020-10-20 | End: 2020-11-10

## 2020-10-20 NOTE — TELEPHONE ENCOUNTER
Next 5 appointments (look out 90 days)    Nov 10, 2020  9:15 AM  Office Visit with Cira Schwab MD  [facility] ([facility]) [address] [facility] [address] [phone]        Rx refilled.    Valery Guillen BSN, RN

## 2020-10-26 ENCOUNTER — ANCILLARY PROCEDURE (OUTPATIENT)
Dept: MAMMOGRAPHY | Facility: CLINIC | Age: 75
End: 2020-10-26
Payer: COMMERCIAL

## 2020-10-26 DIAGNOSIS — Z12.31 VISIT FOR SCREENING MAMMOGRAM: ICD-10-CM

## 2020-10-26 PROCEDURE — 77067 SCR MAMMO BI INCL CAD: CPT | Performed by: RADIOLOGY

## 2020-11-04 NOTE — PROGRESS NOTES
Subjective     Jenifer Baker is a 75 year old female who presents to clinic today for the following health issues:    HPI         Hyperlipidemia Follow-Up      Are you regularly taking any medication or supplement to lower your cholesterol?   Yes- atorvastatin     Are you having muscle aches or other side effects that you think could be caused by your cholesterol lowering medication?  No    Hypertension Follow-up      Do you check your blood pressure regularly outside of the clinic? Yes     Are you following a low salt diet? Yes    Are your blood pressures ever more than 140 on the top number (systolic) OR more   than 90 on the bottom number (diastolic), for example 140/90? Yes- occasionally     Anxiety Follow-Up    How are you doing with your anxiety since your last visit? No change    Are you having other symptoms that might be associated with anxiety? No    Have you had a significant life event? OTHER:  going through cancer treatment      Are you feeling depressed? No    Do you have any concerns with your use of alcohol or other drugs? No    Social History     Tobacco Use     Smoking status: Never Smoker     Smokeless tobacco: Never Used   Substance Use Topics     Alcohol use: Yes     Comment: Wine...on occasion     Drug use: No     ROSALVA-7 SCORE 8/24/2017 10/8/2019 11/10/2020   Total Score - - -   Total Score 3 6 3     PHQ 11/10/2020   PHQ-9 Total Score 2   Q9: Thoughts of better off dead/self-harm past 2 weeks Not at all             How many days per week do you miss taking your medication? 0    Annual Wellness Visit    Patient has been advised of split billing requirements and indicates understanding: Yes     Are you in the first 12 months of your Medicare Part B coverage?  No    Physical Health:    In general, how would you rate your overall physical health? good    Outside of work, how many days during the week do you exercise?4-5 days/week    Outside of work, approximately how many minutes a day do  "you exercise?15-30 minutes    If you drink alcohol do you typically have >3 drinks per day or >7 drinks per week? No    Do you usually eat at least 4 servings of fruit and vegetables a day, include whole grains & fiber and avoid regularly eating high fat or \"junk\" foods? Yes    Do you have any problems taking medications regularly? No    Do you have any side effects from medications? none    Needs assistance for the following daily activities: no assistance needed    Which of the following safety concerns are present in your home?  none identified     Hearing impairment: No    In the past 6 months, have you been bothered by leaking of urine? no    Mental Health:    In general, how would you rate your overall mental or emotional health? good  PHQ-2 Score:      Do you feel safe in your environment? Yes    Have you ever done Advance Care Planning? (For example, a Health Directive, POLST, or a discussion with a medical provider or your loved ones about your wishes)? Yes, patient states has an Advance Care Planning document and will bring a copy to the clinic.    Fall risk:  Fallen 2 or more times in the past year?: No  Any fall with injury in the past year?: Yes    Cognitive Screenin) Repeat 3 items (Leader, Season, Table)    2) Clock draw: NORMAL  3) 3 item recall: Recalls 3 objects  Results: NORMAL clock, 1-2 items recalled: COGNITIVE IMPAIRMENT LESS LIKELY    Mini-CogTM Copyright S Esteban. Licensed by the author for use in [facility]; reprinted with permission (samira@[email or web address]). All rights reserved.      Do you have sleep apnea, excessive snoring or daytime drowsiness?: no    Current providers sharing in care for this patient include:   Patient Care Team:  Cira Schwab MD as PCP - General (Family Practice)  Cira Schwab MD as Assigned PCP  Cristina Mora RN as     Patient has been advised of split billing requirements and indicates understanding: Yes    Review of Systems " "  Constitutional, HEENT, cardiovascular, pulmonary, gi and gu systems are negative, except as otherwise noted.      Objective    /72   Pulse 71   Temp 96.6  F (35.9  C) (Tympanic)   Resp 16   Ht 1.6 m (5' 3\")   Wt 72.2 kg (159 lb 3.2 oz)   SpO2 98%   BMI 28.20 kg/m    Body mass index is 28.2 kg/m .  Physical Exam   GENERAL: healthy, alert and no distress  EYES: Eyes grossly normal to inspection, PERRL and conjunctivae and sclerae normal  HENT: ear canals and TM's normal, nose and mouth without ulcers or lesions  NECK: no adenopathy, no asymmetry, masses, or scars and thyroid normal to palpation  RESP: lungs clear to auscultation - no rales, rhonchi or wheezes  CV: regular rate and rhythm, normal S1 S2, no S3 or S4, no murmur, click or rub, no peripheral edema and peripheral pulses strong  ABDOMEN: soft, nontender, no hepatosplenomegaly, no masses and bowel sounds normal  MS: no gross musculoskeletal defects noted, no edema  SKIN: no suspicious lesions or rashes  NEURO: Normal strength and tone, mentation intact and speech normal  PSYCH: mentation appears normal, affect normal/bright            Assessment & Plan     (Z00.00) Encounter for Medicare annual wellness exam  (primary encounter diagnosis)  Comment: preventive needs reviewed   Plan: see orders in Epic.     (F41.1) Generalized anxiety disorder  Comment: stable and doing well, using 1/2 tab xanax to assist with sleep at times  Plan: ALPRAZolam (XANAX) 0.25 MG tablet, PARoxetine         (PAXIL) 10 MG tablet, OFFICE/OUTPT         VISIT,EST,LEVL III         Refill x 6 months f/u 6 months for OV and labs     (I10) Essential hypertension with goal blood pressure less than 140/90  Comment: to goal  Plan: BASIC METABOLIC PANEL, amLODIPine (NORVASC) 10         MG tablet, valsartan-hydrochlorothiazide         (DIOVAN HCT) 160-25 MG tablet, OFFICE/OUTPT         VISIT,EST,LEVL III         Refill x 6 months     (Z91.89) 10 year risk of MI or stroke 7.5% or " "greater  (Z13.6) CARDIOVASCULAR SCREENING; LDL GOAL LESS THAN 160  Comment: to goal  Plan: atorvastatin (LIPITOR) 10 MG tablet, Lipid         panel reflex to direct LDL Fasting,         OFFICE/OUTPT VISIT,EST,LEVL III        Refill x 1 yr           BMI:   Estimated body mass index is 28.2 kg/m  as calculated from the following:    Height as of this encounter: 1.6 m (5' 3\").    Weight as of this encounter: 72.2 kg (159 lb 3.2 oz).            See Patient Instructions    Return in about 6 months (around 5/10/2021) for Pharmacy BP Check, Non-fasting Lab Work.    Cira Schwab MD  [facility]    "

## 2020-11-10 ENCOUNTER — OFFICE VISIT (OUTPATIENT)
Dept: FAMILY MEDICINE | Facility: CLINIC | Age: 75
End: 2020-11-10
Payer: COMMERCIAL

## 2020-11-10 VITALS
HEART RATE: 71 BPM | BODY MASS INDEX: 28.21 KG/M2 | DIASTOLIC BLOOD PRESSURE: 72 MMHG | RESPIRATION RATE: 16 BRPM | OXYGEN SATURATION: 98 % | TEMPERATURE: 96.6 F | HEIGHT: 63 IN | WEIGHT: 159.2 LBS | SYSTOLIC BLOOD PRESSURE: 134 MMHG

## 2020-11-10 DIAGNOSIS — F41.1 GENERALIZED ANXIETY DISORDER: ICD-10-CM

## 2020-11-10 DIAGNOSIS — Z91.89 10 YEAR RISK OF MI OR STROKE 7.5% OR GREATER: ICD-10-CM

## 2020-11-10 DIAGNOSIS — Z13.6 CARDIOVASCULAR SCREENING; LDL GOAL LESS THAN 160: ICD-10-CM

## 2020-11-10 DIAGNOSIS — Z00.00 ENCOUNTER FOR MEDICARE ANNUAL WELLNESS EXAM: Primary | ICD-10-CM

## 2020-11-10 DIAGNOSIS — I10 ESSENTIAL HYPERTENSION WITH GOAL BLOOD PRESSURE LESS THAN 140/90: ICD-10-CM

## 2020-11-10 LAB
ANION GAP SERPL CALCULATED.3IONS-SCNC: 4 MMOL/L (ref 3–14)
BUN SERPL-MCNC: 20 MG/DL (ref 7–30)
CALCIUM SERPL-MCNC: 9.2 MG/DL (ref 8.5–10.1)
CHLORIDE SERPL-SCNC: 104 MMOL/L (ref 94–109)
CHOLEST SERPL-MCNC: 199 MG/DL
CO2 SERPL-SCNC: 31 MMOL/L (ref 20–32)
CREAT SERPL-MCNC: 0.69 MG/DL (ref 0.52–1.04)
GFR SERPL CREATININE-BSD FRML MDRD: 85 ML/MIN/{1.73_M2}
GLUCOSE SERPL-MCNC: 103 MG/DL (ref 70–99)
HDLC SERPL-MCNC: 88 MG/DL
LDLC SERPL CALC-MCNC: 96 MG/DL
NONHDLC SERPL-MCNC: 111 MG/DL
POTASSIUM SERPL-SCNC: 3.6 MMOL/L (ref 3.4–5.3)
SODIUM SERPL-SCNC: 139 MMOL/L (ref 133–144)
TRIGL SERPL-MCNC: 76 MG/DL

## 2020-11-10 PROCEDURE — 80048 BASIC METABOLIC PNL TOTAL CA: CPT | Performed by: FAMILY MEDICINE

## 2020-11-10 PROCEDURE — 99397 PER PM REEVAL EST PAT 65+ YR: CPT | Performed by: FAMILY MEDICINE

## 2020-11-10 PROCEDURE — 99214 OFFICE O/P EST MOD 30 MIN: CPT | Mod: 25 | Performed by: FAMILY MEDICINE

## 2020-11-10 PROCEDURE — 80061 LIPID PANEL: CPT | Performed by: FAMILY MEDICINE

## 2020-11-10 PROCEDURE — 36415 COLL VENOUS BLD VENIPUNCTURE: CPT | Performed by: FAMILY MEDICINE

## 2020-11-10 RX ORDER — VALSARTAN AND HYDROCHLOROTHIAZIDE 160; 25 MG/1; MG/1
1 TABLET ORAL DAILY
Qty: 90 TABLET | Refills: 0 | Status: SHIPPED | OUTPATIENT
Start: 2020-11-10 | End: 2021-01-18

## 2020-11-10 RX ORDER — AMLODIPINE BESYLATE 10 MG/1
10 TABLET ORAL DAILY
Qty: 90 TABLET | Refills: 0 | Status: SHIPPED | OUTPATIENT
Start: 2020-11-10 | End: 2021-01-06

## 2020-11-10 RX ORDER — ATORVASTATIN CALCIUM 10 MG/1
10 TABLET, FILM COATED ORAL DAILY
Qty: 90 TABLET | Refills: 2 | Status: SHIPPED | OUTPATIENT
Start: 2020-11-10 | End: 2021-11-09

## 2020-11-10 RX ORDER — PAROXETINE 10 MG/1
10 TABLET, FILM COATED ORAL DAILY
Qty: 90 TABLET | Refills: 0 | Status: SHIPPED | OUTPATIENT
Start: 2020-11-10 | End: 2021-03-15

## 2020-11-10 RX ORDER — ALPRAZOLAM 0.25 MG
0.25 TABLET ORAL
Qty: 90 TABLET | Refills: 1 | Status: SHIPPED | OUTPATIENT
Start: 2020-11-10 | End: 2021-05-28

## 2020-11-10 ASSESSMENT — MIFFLIN-ST. JEOR: SCORE: 1186.26

## 2020-11-10 ASSESSMENT — ANXIETY QUESTIONNAIRES
3. WORRYING TOO MUCH ABOUT DIFFERENT THINGS: SEVERAL DAYS
7. FEELING AFRAID AS IF SOMETHING AWFUL MIGHT HAPPEN: NOT AT ALL
2. NOT BEING ABLE TO STOP OR CONTROL WORRYING: SEVERAL DAYS
GAD7 TOTAL SCORE: 3
IF YOU CHECKED OFF ANY PROBLEMS ON THIS QUESTIONNAIRE, HOW DIFFICULT HAVE THESE PROBLEMS MADE IT FOR YOU TO DO YOUR WORK, TAKE CARE OF THINGS AT HOME, OR GET ALONG WITH OTHER PEOPLE: NOT DIFFICULT AT ALL
6. BECOMING EASILY ANNOYED OR IRRITABLE: NOT AT ALL
5. BEING SO RESTLESS THAT IT IS HARD TO SIT STILL: NOT AT ALL
1. FEELING NERVOUS, ANXIOUS, OR ON EDGE: SEVERAL DAYS

## 2020-11-10 ASSESSMENT — PATIENT HEALTH QUESTIONNAIRE - PHQ9
5. POOR APPETITE OR OVEREATING: NOT AT ALL
SUM OF ALL RESPONSES TO PHQ QUESTIONS 1-9: 2

## 2020-11-10 NOTE — NURSING NOTE
"Chief Complaint   Patient presents with     Hypertension     Lipids     Anxiety     Health Maintenance     Ajith-7       Initial /72   Pulse 71   Temp 96.6  F (35.9  C) (Tympanic)   Resp 16   Ht 1.6 m (5' 3\")   Wt 72.2 kg (159 lb 3.2 oz)   SpO2 98%   BMI 28.20 kg/m   Estimated body mass index is 28.2 kg/m  as calculated from the following:    Height as of this encounter: 1.6 m (5' 3\").    Weight as of this encounter: 72.2 kg (159 lb 3.2 oz).  Medication Reconciliation: complete  Ruslan Bell CMA    "

## 2020-11-10 NOTE — PATIENT INSTRUCTIONS
Patient Education   Personalized Prevention Plan  You are due for the preventive services outlined below.  Your care team is available to assist you in scheduling these services.  If you have already completed any of these items, please share that information with your care team to update in your medical record.  Health Maintenance Due   Topic Date Due     Zoster (Shingles) Vaccine (2 of 3) 06/23/2015     PHQ-2  01/01/2020     Basic Metabolic Panel  03/30/2020     FALL RISK ASSESSMENT  10/08/2020

## 2020-11-11 ASSESSMENT — ANXIETY QUESTIONNAIRES: GAD7 TOTAL SCORE: 3

## 2021-01-06 DIAGNOSIS — I10 ESSENTIAL HYPERTENSION WITH GOAL BLOOD PRESSURE LESS THAN 140/90: ICD-10-CM

## 2021-01-06 RX ORDER — AMLODIPINE BESYLATE 10 MG/1
TABLET ORAL
Qty: 30 TABLET | Refills: 0 | Status: SHIPPED | OUTPATIENT
Start: 2021-01-06 | End: 2021-03-15

## 2021-01-06 NOTE — TELEPHONE ENCOUNTER
11/10/20 Sent (none) Cira Schwab MD [facility]     amLODIPine (NORVASC) 10 MG tablet 90 tablet 0 11/10/2020     [facility] #[id number] - [facility] [address] [facility].  Thank you. Clarissa Peña R.N.

## 2021-01-15 DIAGNOSIS — I10 ESSENTIAL HYPERTENSION WITH GOAL BLOOD PRESSURE LESS THAN 140/90: ICD-10-CM

## 2021-01-18 RX ORDER — VALSARTAN AND HYDROCHLOROTHIAZIDE 160; 25 MG/1; MG/1
TABLET ORAL
Qty: 90 TABLET | Refills: 2 | Status: SHIPPED | OUTPATIENT
Start: 2021-01-18 | End: 2022-01-17

## 2021-02-04 ENCOUNTER — IMMUNIZATION (OUTPATIENT)
Dept: NURSING | Facility: CLINIC | Age: 76
End: 2021-02-04
Payer: COMMERCIAL

## 2021-02-04 PROCEDURE — 0001A PR COVID VAC PFIZER DIL RECON 30 MCG/0.3 ML IM: CPT

## 2021-02-04 PROCEDURE — 91300 PR COVID VAC PFIZER DIL RECON 30 MCG/0.3 ML IM: CPT

## 2021-02-25 ENCOUNTER — IMMUNIZATION (OUTPATIENT)
Dept: NURSING | Facility: CLINIC | Age: 76
End: 2021-02-25
Attending: FAMILY MEDICINE
Payer: COMMERCIAL

## 2021-02-25 PROCEDURE — 0002A PR COVID VAC PFIZER DIL RECON 30 MCG/0.3 ML IM: CPT

## 2021-02-25 PROCEDURE — 91300 PR COVID VAC PFIZER DIL RECON 30 MCG/0.3 ML IM: CPT

## 2021-05-28 DIAGNOSIS — I10 HYPERTENSION GOAL BP (BLOOD PRESSURE) < 140/90: Primary | ICD-10-CM

## 2021-05-28 DIAGNOSIS — F41.1 GENERALIZED ANXIETY DISORDER: ICD-10-CM

## 2021-05-28 RX ORDER — ALPRAZOLAM 0.25 MG
0.25 TABLET ORAL
Qty: 90 TABLET | Refills: 1 | Status: SHIPPED | OUTPATIENT
Start: 2021-05-28 | End: 2022-01-18

## 2021-05-28 NOTE — TELEPHONE ENCOUNTER
Routing refill request to provider for review/approval because:  Drug not on the [facility] refill protocol     Drea BAKERN, RN

## 2021-05-28 NOTE — LETTER
June 1, 2021    Jenifer Baker  [address]  [place] [address]    Dear Jenifer,       We recently received a refill request for ALPRAZolam (XANAX) 0.25 MG tablet.  We have refilled this for a one time 90 day supply only because you are due for a:    Blood pressure nurse visit and lab appointment      Please schedule this lab appointment 4-5 days prior to the office visit.     Please call at your earliest convenience so that there will not be a delay with your future refills.          Thank you,   Your [facility] Team/sp  [phone]

## 2021-09-05 ENCOUNTER — HEALTH MAINTENANCE LETTER (OUTPATIENT)
Age: 76
End: 2021-09-05

## 2021-09-20 ENCOUNTER — MYC MEDICAL ADVICE (OUTPATIENT)
Dept: FAMILY MEDICINE | Facility: CLINIC | Age: 76
End: 2021-09-20

## 2021-09-20 DIAGNOSIS — E78.5 HYPERLIPIDEMIA LDL GOAL <100: Primary | ICD-10-CM

## 2021-09-20 DIAGNOSIS — I10 ESSENTIAL HYPERTENSION WITH GOAL BLOOD PRESSURE LESS THAN 140/90: ICD-10-CM

## 2021-09-20 DIAGNOSIS — F41.1 GENERALIZED ANXIETY DISORDER: ICD-10-CM

## 2021-09-20 RX ORDER — AMLODIPINE BESYLATE 10 MG/1
TABLET ORAL
Qty: 90 TABLET | Refills: 0 | Status: SHIPPED | OUTPATIENT
Start: 2021-09-20 | End: 2021-12-28

## 2021-09-20 RX ORDER — PAROXETINE 10 MG/1
TABLET, FILM COATED ORAL
Qty: 90 TABLET | Refills: 0 | Status: SHIPPED | OUTPATIENT
Start: 2021-09-20 | End: 2021-12-29

## 2021-12-26 ENCOUNTER — HEALTH MAINTENANCE LETTER (OUTPATIENT)
Age: 76
End: 2021-12-26

## 2021-12-28 ENCOUNTER — MYC REFILL (OUTPATIENT)
Dept: FAMILY MEDICINE | Facility: CLINIC | Age: 76
End: 2021-12-28
Payer: COMMERCIAL

## 2021-12-28 DIAGNOSIS — F41.1 GENERALIZED ANXIETY DISORDER: ICD-10-CM

## 2021-12-28 DIAGNOSIS — I10 ESSENTIAL HYPERTENSION WITH GOAL BLOOD PRESSURE LESS THAN 140/90: ICD-10-CM

## 2021-12-28 RX ORDER — PAROXETINE 10 MG/1
10 TABLET, FILM COATED ORAL DAILY
Qty: 90 TABLET | Refills: 0 | Status: CANCELLED | OUTPATIENT
Start: 2021-12-28

## 2021-12-29 DIAGNOSIS — F41.1 GENERALIZED ANXIETY DISORDER: ICD-10-CM

## 2021-12-29 RX ORDER — PAROXETINE 10 MG/1
TABLET, FILM COATED ORAL
Qty: 90 TABLET | Refills: 0 | Status: SHIPPED | OUTPATIENT
Start: 2021-12-29 | End: 2022-01-18

## 2021-12-30 RX ORDER — AMLODIPINE BESYLATE 10 MG/1
10 TABLET ORAL DAILY
Qty: 90 TABLET | Refills: 1 | Status: SHIPPED | OUTPATIENT
Start: 2021-12-30 | End: 2022-10-07

## 2021-12-30 NOTE — TELEPHONE ENCOUNTER
"Requested Prescriptions   Pending Prescriptions Disp Refills     amLODIPine (NORVASC) 10 MG tablet 90 tablet 0     Sig: Take 1 tablet (10 mg) by mouth daily       Calcium Channel Blockers Protocol  Failed - 12/28/2021  4:55 PM        Failed - Blood pressure under 140/90 in past 12 months     BP Readings from Last 3 Encounters:   11/10/20 134/72   06/10/20 116/78   10/08/19 (!) 146/76                 Failed - Normal serum creatinine on file in past 12 months     Recent Labs   Lab Test 11/10/20  1005   CR 0.69       Ok to refill medication if creatinine is low          Passed - Recent (12 mo) or future (30 days) visit within the authorizing provider's specialty     Patient has had an office visit with the authorizing provider or a provider within the authorizing providers department within the previous 12 mos or has a future within next 30 days. See \"Patient Info\" tab in inbasket, or \"Choose Columns\" in Meds & Orders section of the refill encounter.              Passed - Medication is active on med list        Passed - Patient is age 18 or older        Passed - No active pregnancy on record        Passed - No positive pregnancy test in past 12 months             "

## 2022-01-10 NOTE — PROGRESS NOTES
"SUBJECTIVE:   Jenifer Baker is a 76 year old female who presents for Preventive Visit.      Patient has been advised of split billing requirements and indicates understanding: Yes   Are you in the first 12 months of your Medicare coverage?  No    Healthy Habits:     In general, how would you rate your overall health?  Good    Frequency of exercise:  1 day/week    Duration of exercise:  N/A    Do you usually eat at least 4 servings of fruit and vegetables a day, include whole grains    & fiber and avoid regularly eating high fat or \"junk\" foods?  Yes    Taking medications regularly:  Yes    Medication side effects:  None    Ability to successfully perform activities of daily living:  No assistance needed    Home Safety:  No safety concerns identified    Hearing Impairment:  No hearing concerns    In the past 6 months, have you been bothered by leaking of urine? Yes    In general, how would you rate your overall mental or emotional health?  Good      PHQ-2 Total Score: 0    Additional concerns today:  No    Do you feel safe in your environment? Yes    Have you ever done Advance Care Planning? (For example, a Health Directive, POLST, or a discussion with a medical provider or your loved ones about your wishes): Yes, advance care planning is on file.       Fall risk  Fallen 2 or more times in the past year?: No  Any fall with injury in the past year?: No    Cognitive Screening   1) Repeat 3 items (Leader, Season, Table)    2) Clock draw: NORMAL  3) 3 item recall: Recalls 3 objects  Results: 3 items recalled: COGNITIVE IMPAIRMENT LESS LIKELY    Mini-CogTM Copyright ZARIA Orellana. Licensed by the author for use in [facility]; reprinted with permission ([email or web address]). All rights reserved.      Do you have sleep apnea, excessive snoring or daytime drowsiness?: no    Reviewed and updated as needed this visit by clinical staff  Tobacco  Allergies  Meds  Problems  Med Hx  Surg Hx  Fam Hx         Reviewed and " updated as needed this visit by Provider  Tobacco  Allergies  Meds  Problems  Med Hx  Surg Hx  Fam Hx        Social History     Tobacco Use     Smoking status: Never Smoker     Smokeless tobacco: Never Used   Substance Use Topics     Alcohol use: Yes     Comment: Wine...on occasion         Alcohol Use 1/18/2022   Prescreen: >3 drinks/day or >7 drinks/week? No   Prescreen: >3 drinks/day or >7 drinks/week? -               Current providers sharing in care for this patient include:   Patient Care Team:  Cira Schwab MD as PCP - General (Family Practice)  Cira Schwab MD as Assigned PCP  Cristina Mora, RN as     The following health maintenance items are reviewed in Epic and correct as of today:  Health Maintenance Due   Topic Date Due     ANNUAL REVIEW OF HM ORDERS  Never done     ZOSTER IMMUNIZATION (2 of 3) 06/23/2015     FALL RISK ASSESSMENT  11/10/2021     G 3 P 1   No LMP recorded. (Menstrual status: Tubal ).     Fasting: No   Td: tdap 8/2017       Flu: 10/2021      Covid: Pf boost 11/24/2021      Shingrix: discussed      PPV: done      NO - age 65 - see link Cervical Cytology Screening Guidelines               Cholesterol:   Lab Results   Component Value Date    CHOL 175 01/13/2022    CHOL 199 11/10/2020     Lab Results   Component Value Date    HDL 96 01/13/2022    HDL 88 11/10/2020     Lab Results   Component Value Date    LDL 60 01/13/2022    LDL 96 11/10/2020     Lab Results   Component Value Date    TRIG 93 01/13/2022    TRIG 76 11/10/2020     Lab Results   Component Value Date    CHOLHDLRATIO 3.1 08/16/2013         MMG: 10/2020  Dexa:  2/2017     Flex/colo: 8/2015      Seat Belt: Yes    Sunscreen use: Yes   Calcium Intake: adeq  Health Care Directive: Yes   Sexually Active: No    Current contraception: none  History of abnormal Pap smear: No  Family history of colon/breast/ovarian cancer: No  Regular self breast exam: Yes  History of abnormal mammogram: No      Labs  reviewed in EPIC  BP Readings from Last 3 Encounters:   01/18/22 137/74   11/10/20 134/72   06/10/20 116/78    Wt Readings from Last 3 Encounters:   01/18/22 70.9 kg (156 lb 6 oz)   11/10/20 72.2 kg (159 lb 3.2 oz)   06/10/20 73 kg (161 lb)                  Patient Active Problem List   Diagnosis     Health Care Home     Advanced directives, counseling/discussion     Primary localized osteoarthrosis of lower leg     Generalized anxiety disorder     Essential hypertension with goal blood pressure less than 140/90     Hyperlipidemia LDL goal <100     Past Surgical History:   Procedure Laterality Date     APPENDECTOMY  1973     CHOLECYSTECTOMY  1973     COLONOSCOPY  2015     GALLBLADDER SURGERY       JOINT REPLACEMENT  07/17/2018    left knee     JOINT REPLACEMENT  2016    right knee     ORTHOPEDIC SURGERY  Knee replacement    Nov. 2015     TUBAL LIGATION         Social History     Tobacco Use     Smoking status: Never Smoker     Smokeless tobacco: Never Used   Substance Use Topics     Alcohol use: Yes     Comment: Wine...on occasion     Family History   Problem Relation Age of Onset     Cancer Mother 89        Utreine cancer     Hypertension Mother      Cerebrovascular Disease Mother      Other Cancer Mother      Depression Mother      Anxiety Disorder Mother      Arthritis Father      Cancer Father         Lung cancer     Other Cancer Father          Current Outpatient Medications   Medication Sig Dispense Refill     ALPRAZolam (XANAX) 0.25 MG tablet TAKE 1 TABLET (0.25 MG) BY MOUTH NIGHTLY AS NEEDED FOR ANXIETY 90 tablet 1     amLODIPine (NORVASC) 10 MG tablet Take 1 tablet (10 mg) by mouth daily 90 tablet 1     aspirin 81 MG tablet Take by mouth daily       atorvastatin (LIPITOR) 10 MG tablet TAKE 1 TABLET BY MOUTH EVERY DAY 90 tablet 0     Calcium-Magnesium-Vitamin D 600-300-100 LIQD        multivitamin, therapeutic with minerals (MULTI-VITAMIN) TABS Take 1 tablet by mouth daily       NONFORMULARY 2 times daily  "Hemp oil       omega-3 fatty acids (FISH OIL) 1200 MG capsule Take 1 capsule by mouth daily       PARoxetine (PAXIL) 10 MG tablet TAKE 1 TABLET BY MOUTH EVERY DAY 90 tablet 0     Turmeric (QC TUMERIC COMPLEX) 500 MG CAPS        valsartan-hydrochlorothiazide (DIOVAN HCT) 160-25 MG tablet TAKE 1 TABLET BY MOUTH EVERY DAY 90 tablet 2     Vitamin D, Cholecalciferol, 1000 UNITS CAPS Take 1 capsule by mouth daily       vitamin E 400 UNIT capsule Take by mouth daily           Mammogram Screening - Patient over age 75, has elected to continue with screening.  Pertinent mammograms are reviewed under the imaging tab.    Review of Systems   Constitutional: Positive for chills.   Eyes: Positive for visual disturbance.   Breasts:  Negative for tenderness, breast mass and discharge.   Genitourinary: Negative for pelvic pain, vaginal bleeding and vaginal discharge.   Musculoskeletal: Positive for myalgias.         OBJECTIVE:   /74   Pulse 68   Temp 97.7  F (36.5  C) (Tympanic)   Resp 18   Ht 1.581 m (5' 2.25\")   Wt 70.9 kg (156 lb 6 oz)   SpO2 100%   BMI 28.37 kg/m   Estimated body mass index is 28.37 kg/m  as calculated from the following:    Height as of this encounter: 1.581 m (5' 2.25\").    Weight as of this encounter: 70.9 kg (156 lb 6 oz).  Physical Exam  GENERAL APPEARANCE: healthy, alert and no distress  EYES: Eyes grossly normal to inspection, PERRL and conjunctivae and sclerae normal  HENT: ear canals and TM's normal, nose and mouth without ulcers or lesions, oropharynx clear and oral mucous membranes moist  NECK: no adenopathy, no asymmetry, masses, or scars and thyroid normal to palpation  RESP: lungs clear to auscultation - no rales, rhonchi or wheezes  BREAST: normal without masses, tenderness or nipple discharge and no palpable axillary masses or adenopathy  CV: regular rate and rhythm, normal S1 S2, no S3 or S4, no murmur, click or rub, no peripheral edema and peripheral pulses strong  ABDOMEN: soft, " "nontender, no hepatosplenomegaly, no masses and bowel sounds normal  MS: no musculoskeletal defects are noted and gait is age appropriate without ataxia, slight decreased ROM of left shoulder due to pain  SKIN: no suspicious lesions or rashes  NEURO: Normal strength and tone, sensory exam grossly normal, mentation intact and speech normal  PSYCH: mentation appears normal and affect normal/bright    Diagnostic Test Results:  Labs reviewed in Epic    ASSESSMENT / PLAN:   (Z00.00) Encounter for Medicare annual wellness exam  (primary encounter diagnosis)  Comment: preventive needs reviewed   Plan: see orders in Epic.     (F41.1) Generalized anxiety disorder  Comment: stable, usually taking 1/2 tablet at night  Plan: ALPRAZolam (XANAX) 0.25 MG tablet, OFFICE/OUTPT        VISIT,EST,LEVL IV, PARoxetine (PAXIL) 10 MG         tablet         Refill x 6 months     (I10) Essential hypertension with goal blood pressure less than 140/90  Comment: to goal  Plan: valsartan-hydrochlorothiazide (DIOVAN HCT)         160-25 MG tablet, OFFICE/OUTPT VISIT,EST,LEVL         IV         Refill x 6 months f/u 6 months for pharm bp check and labs     (Z91.89) 10 year risk of MI or stroke 7.5% or greater  Comment: on statin  Plan: atorvastatin (LIPITOR) 10 MG tablet,         OFFICE/OUTPT VISIT,EST,LEVL IV        Refill x 1 yr     (M25.512,  G89.29) Chronic left shoulder pain  Comment: increasing  Plan: consider PT vs orthor    Patient has been advised of split billing requirements and indicates understanding: Yes  COUNSELING:  Reviewed preventive health counseling, as reflected in patient instructions  Special attention given to:       Regular exercise       Healthy diet/nutrition    Estimated body mass index is 28.37 kg/m  as calculated from the following:    Height as of this encounter: 1.581 m (5' 2.25\").    Weight as of this encounter: 70.9 kg (156 lb 6 oz).        She reports that she has never smoked. She has never used smokeless " tobacco.      Appropriate preventive services were discussed with this patient, including applicable screening as appropriate for cardiovascular disease, diabetes, osteopenia/osteoporosis, and glaucoma.  As appropriate for age/gender, discussed screening for colorectal cancer, prostate cancer, breast cancer, and cervical cancer. Checklist reviewing preventive services available has been given to the patient.    Reviewed patients plan of care and provided an AVS. The Intermediate Care Plan ( asthma action plan, low back pain action plan, and migraine action plan) for Jenifer meets the Care Plan requirement. This Care Plan has been established and reviewed with the Patient.    Counseling Resources:  ATP IV Guidelines  Pooled Cohorts Equation Calculator  Breast Cancer Risk Calculator  Breast Cancer: Medication to Reduce Risk  FRAX Risk Assessment  ICSI Preventive Guidelines  Dietary Guidelines for Americans, 2010  USDA's MyPlate  ASA Prophylaxis  Lung CA Screening    Cira Schwab MD  [facility]    Identified Health Risks:

## 2022-01-10 NOTE — PATIENT INSTRUCTIONS
Patient Education   Personalized Prevention Plan  You are due for the preventive services outlined below.  Your care team is available to assist you in scheduling these services.  If you have already completed any of these items, please share that information with your care team to update in your medical record.  Health Maintenance Due   Topic Date Due     ANNUAL REVIEW OF  ORDERS  Never done     Zoster (Shingles) Vaccine (2 of 3) 06/23/2015     FALL RISK ASSESSMENT  11/10/2021       Check with insurance if Shingrix is covered, is it less expensive at a pharmacy vs at the doctor's office.    No

## 2022-01-13 ENCOUNTER — LAB (OUTPATIENT)
Dept: LAB | Facility: CLINIC | Age: 77
End: 2022-01-13
Payer: COMMERCIAL

## 2022-01-13 DIAGNOSIS — E78.5 HYPERLIPIDEMIA LDL GOAL <100: ICD-10-CM

## 2022-01-13 DIAGNOSIS — I10 ESSENTIAL HYPERTENSION WITH GOAL BLOOD PRESSURE LESS THAN 140/90: ICD-10-CM

## 2022-01-13 LAB
ANION GAP SERPL CALCULATED.3IONS-SCNC: 11 MMOL/L (ref 3–14)
BUN SERPL-MCNC: 19 MG/DL (ref 7–30)
CALCIUM SERPL-MCNC: 9.5 MG/DL (ref 8.5–10.1)
CHLORIDE BLD-SCNC: 103 MMOL/L (ref 94–109)
CHOLEST SERPL-MCNC: 175 MG/DL
CO2 SERPL-SCNC: 25 MMOL/L (ref 20–32)
CREAT SERPL-MCNC: 0.9 MG/DL (ref 0.52–1.04)
FASTING STATUS PATIENT QL REPORTED: YES
GFR SERPL CREATININE-BSD FRML MDRD: 66 ML/MIN/1.73M2
GLUCOSE BLD-MCNC: 114 MG/DL (ref 70–99)
HDLC SERPL-MCNC: 96 MG/DL
LDLC SERPL CALC-MCNC: 60 MG/DL
NONHDLC SERPL-MCNC: 79 MG/DL
POTASSIUM BLD-SCNC: 4.2 MMOL/L (ref 3.4–5.3)
SODIUM SERPL-SCNC: 139 MMOL/L (ref 133–144)
TRIGL SERPL-MCNC: 93 MG/DL

## 2022-01-13 PROCEDURE — 80061 LIPID PANEL: CPT

## 2022-01-13 PROCEDURE — 80048 BASIC METABOLIC PNL TOTAL CA: CPT

## 2022-01-13 PROCEDURE — 36415 COLL VENOUS BLD VENIPUNCTURE: CPT

## 2022-01-16 DIAGNOSIS — I10 ESSENTIAL HYPERTENSION WITH GOAL BLOOD PRESSURE LESS THAN 140/90: ICD-10-CM

## 2022-01-16 RX ORDER — VALSARTAN AND HYDROCHLOROTHIAZIDE 160; 25 MG/1; MG/1
TABLET ORAL
Qty: 90 TABLET | Refills: 2 | OUTPATIENT
Start: 2022-01-16

## 2022-01-18 ENCOUNTER — OFFICE VISIT (OUTPATIENT)
Dept: FAMILY MEDICINE | Facility: CLINIC | Age: 77
End: 2022-01-18
Payer: COMMERCIAL

## 2022-01-18 VITALS
TEMPERATURE: 97.7 F | HEIGHT: 62 IN | WEIGHT: 156.38 LBS | DIASTOLIC BLOOD PRESSURE: 74 MMHG | RESPIRATION RATE: 18 BRPM | HEART RATE: 68 BPM | SYSTOLIC BLOOD PRESSURE: 137 MMHG | BODY MASS INDEX: 28.78 KG/M2 | OXYGEN SATURATION: 100 %

## 2022-01-18 DIAGNOSIS — M25.512 CHRONIC LEFT SHOULDER PAIN: ICD-10-CM

## 2022-01-18 DIAGNOSIS — Z91.89 10 YEAR RISK OF MI OR STROKE 7.5% OR GREATER: ICD-10-CM

## 2022-01-18 DIAGNOSIS — G89.29 CHRONIC LEFT SHOULDER PAIN: ICD-10-CM

## 2022-01-18 DIAGNOSIS — I10 ESSENTIAL HYPERTENSION WITH GOAL BLOOD PRESSURE LESS THAN 140/90: ICD-10-CM

## 2022-01-18 DIAGNOSIS — Z00.00 ENCOUNTER FOR MEDICARE ANNUAL WELLNESS EXAM: Primary | ICD-10-CM

## 2022-01-18 DIAGNOSIS — F41.1 GENERALIZED ANXIETY DISORDER: ICD-10-CM

## 2022-01-18 PROCEDURE — 99214 OFFICE O/P EST MOD 30 MIN: CPT | Mod: 25 | Performed by: FAMILY MEDICINE

## 2022-01-18 PROCEDURE — 99397 PER PM REEVAL EST PAT 65+ YR: CPT | Performed by: FAMILY MEDICINE

## 2022-01-18 RX ORDER — ALPRAZOLAM 0.25 MG
0.25 TABLET ORAL
Qty: 90 TABLET | Refills: 1 | Status: SHIPPED | OUTPATIENT
Start: 2022-01-18 | End: 2023-01-06

## 2022-01-18 RX ORDER — VALSARTAN AND HYDROCHLOROTHIAZIDE 160; 25 MG/1; MG/1
1 TABLET ORAL DAILY
Qty: 90 TABLET | Refills: 1 | Status: SHIPPED | OUTPATIENT
Start: 2022-01-18 | End: 2022-07-29

## 2022-01-18 RX ORDER — ATORVASTATIN CALCIUM 10 MG/1
10 TABLET, FILM COATED ORAL DAILY
Qty: 90 TABLET | Refills: 3 | Status: SHIPPED | OUTPATIENT
Start: 2022-01-18 | End: 2023-01-18

## 2022-01-18 RX ORDER — VIT C/B6/B5/MAGNESIUM/HERB 173 50-5-6-5MG
CAPSULE ORAL
COMMUNITY
Start: 2021-08-01

## 2022-01-18 RX ORDER — PAROXETINE 10 MG/1
10 TABLET, FILM COATED ORAL DAILY
Qty: 90 TABLET | Refills: 1 | Status: SHIPPED | OUTPATIENT
Start: 2022-01-18 | End: 2022-09-30

## 2022-01-18 ASSESSMENT — ENCOUNTER SYMPTOMS
CHILLS: 1
BREAST MASS: 0
MYALGIAS: 1

## 2022-01-18 ASSESSMENT — PAIN SCALES - GENERAL: PAINLEVEL: NO PAIN (0)

## 2022-01-18 ASSESSMENT — MIFFLIN-ST. JEOR: SCORE: 1156.53

## 2022-01-18 ASSESSMENT — ACTIVITIES OF DAILY LIVING (ADL): CURRENT_FUNCTION: NO ASSISTANCE NEEDED

## 2022-04-14 ENCOUNTER — OFFICE VISIT (OUTPATIENT)
Dept: URGENT CARE | Facility: URGENT CARE | Age: 77
End: 2022-04-14
Payer: COMMERCIAL

## 2022-04-14 VITALS
SYSTOLIC BLOOD PRESSURE: 128 MMHG | OXYGEN SATURATION: 95 % | WEIGHT: 159.6 LBS | BODY MASS INDEX: 28.96 KG/M2 | TEMPERATURE: 98.7 F | RESPIRATION RATE: 16 BRPM | HEART RATE: 89 BPM | DIASTOLIC BLOOD PRESSURE: 69 MMHG

## 2022-04-14 DIAGNOSIS — R07.0 THROAT PAIN: Primary | ICD-10-CM

## 2022-04-14 LAB — DEPRECATED S PYO AG THROAT QL EIA: NEGATIVE

## 2022-04-14 PROCEDURE — 99213 OFFICE O/P EST LOW 20 MIN: CPT | Performed by: FAMILY MEDICINE

## 2022-04-14 PROCEDURE — 87651 STREP A DNA AMP PROBE: CPT | Performed by: FAMILY MEDICINE

## 2022-04-15 LAB — GROUP A STREP BY PCR: NOT DETECTED

## 2022-04-15 NOTE — PROGRESS NOTES
SUBJECTIVE:  Jenifer Baker, a 76 year old female scheduled an appointment to discuss the following issues:  Throat pain  Strep contacts with grandkids - on antibiotic.  Runny nose. No more. Headache. Tylenol prn. No fever. No chills.  No strep for a long time. No sinus infection.  No body aches. No flu or covid concerns.   Medical, social, surgical, and family histories reviewed.    ROS:  All other ROS negative    OBJECTIVE:  /69   Pulse 89   Temp 98.7  F (37.1  C) (Tympanic)   Resp 16   Wt 72.4 kg (159 lb 9.6 oz)   SpO2 95%   BMI 28.96 kg/m    EXAM:  GENERAL APPEARANCE: healthy, alert and no distress  EYES: EOMI,  PERRL  HENT: ear canals and TM's normal and nose clear discharge and mouth without ulcers or lesions/MMM  NECK: no adenopathy, no asymmetry, masses, or scars and thyroid normal to palpation  RESP: lungs clear to auscultation - no rales, rhonchi or wheezes  CV: regular rates and rhythm, normal S1 S2, no S3 or S4 and no murmur, click or rub -  ABDOMEN:  soft, nontender, no HSM or masses and bowel sounds normal  MS: extremities normal- no gross deformities noted, no evidence of inflammation in joints, FROM in all extremities.  SKIN: no suspicious lesions or rashes  PSYCH: mentation appears normal and affect normal/bright    ASSESSMENT/PLAN:  (R07.0) Throat pain  (primary encounter diagnosis)  Comment: likely viral uri  Plan: Streptococcus A Rapid Screen w/Reflex to PCR -         Clinic Collect        Tylenol prn and thick fluids. Await throat culture. Consider antibiotic amox if worse.  Expected course and warning signs reviewed. .Call/email with questions/concerns     Luis Mcwilliams MD

## 2022-07-29 DIAGNOSIS — I10 ESSENTIAL HYPERTENSION WITH GOAL BLOOD PRESSURE LESS THAN 140/90: ICD-10-CM

## 2022-07-29 RX ORDER — VALSARTAN AND HYDROCHLOROTHIAZIDE 160; 25 MG/1; MG/1
TABLET ORAL
Qty: 90 TABLET | Refills: 0 | Status: SHIPPED | OUTPATIENT
Start: 2022-07-29 | End: 2022-07-29

## 2022-07-29 RX ORDER — VALSARTAN AND HYDROCHLOROTHIAZIDE 160; 25 MG/1; MG/1
1 TABLET ORAL DAILY
Qty: 90 TABLET | Refills: 1 | Status: SHIPPED | OUTPATIENT
Start: 2022-07-29 | End: 2023-01-18

## 2022-07-29 NOTE — TELEPHONE ENCOUNTER
Please mail letter    Johnathon ARCOS,    I received a refill request for valsartan/hctz.  I have sent a 6 month supply to your pharmacy.  You are due for labs now; I have ordered the tests, please call to make a non-fasting lab appointment within 2 weeks.     Cira Schwab MD

## 2022-07-29 NOTE — LETTER
July 29, 2022    Jenifer Baker  [address]  [place] [address]    Hi Jenifer,     I received a refill request for valsartan/hctz.  I have sent a 6 month supply to your pharmacy.  You are due for labs now; I have ordered the tests, please call to make a non-fasting lab appointment within 2 weeks.      Cira Schwab MD

## 2022-08-24 ENCOUNTER — DOCUMENTATION ONLY (OUTPATIENT)
Dept: LAB | Facility: CLINIC | Age: 77
End: 2022-08-24

## 2022-08-24 DIAGNOSIS — I10 ESSENTIAL HYPERTENSION WITH GOAL BLOOD PRESSURE LESS THAN 140/90: Primary | ICD-10-CM

## 2022-08-24 DIAGNOSIS — E78.5 HYPERLIPIDEMIA LDL GOAL <100: ICD-10-CM

## 2022-08-24 NOTE — PROGRESS NOTES
Jenifer Tori Baker has an upcoming lab appointment:    Future Appointments   Date Time Provider Department Center   8/30/2022  9:45 AM AN LAB [facility]         There is no order available. Please review and place either future orders or HMPO (Review of Health Maintenance Protocol Orders), as appropriate.    Health Maintenance Due   Topic     ANNUAL REVIEW OF HM ORDERS      JUDY Humphrey

## 2022-08-30 ENCOUNTER — LAB (OUTPATIENT)
Dept: LAB | Facility: CLINIC | Age: 77
End: 2022-08-30
Payer: COMMERCIAL

## 2022-08-30 DIAGNOSIS — I10 ESSENTIAL HYPERTENSION WITH GOAL BLOOD PRESSURE LESS THAN 140/90: ICD-10-CM

## 2022-08-30 DIAGNOSIS — E78.5 HYPERLIPIDEMIA LDL GOAL <100: ICD-10-CM

## 2022-08-30 LAB
ANION GAP SERPL CALCULATED.3IONS-SCNC: 5 MMOL/L (ref 3–14)
BUN SERPL-MCNC: 11 MG/DL (ref 7–30)
CALCIUM SERPL-MCNC: 9.3 MG/DL (ref 8.5–10.1)
CHLORIDE BLD-SCNC: 104 MMOL/L (ref 94–109)
CHOLEST SERPL-MCNC: 175 MG/DL
CO2 SERPL-SCNC: 29 MMOL/L (ref 20–32)
CREAT SERPL-MCNC: 0.76 MG/DL (ref 0.52–1.04)
FASTING STATUS PATIENT QL REPORTED: YES
GFR SERPL CREATININE-BSD FRML MDRD: 80 ML/MIN/1.73M2
GLUCOSE BLD-MCNC: 107 MG/DL (ref 70–99)
HDLC SERPL-MCNC: 81 MG/DL
LDLC SERPL CALC-MCNC: 73 MG/DL
NONHDLC SERPL-MCNC: 94 MG/DL
POTASSIUM BLD-SCNC: 3.5 MMOL/L (ref 3.4–5.3)
SODIUM SERPL-SCNC: 138 MMOL/L (ref 133–144)
TRIGL SERPL-MCNC: 107 MG/DL

## 2022-08-30 PROCEDURE — 36415 COLL VENOUS BLD VENIPUNCTURE: CPT

## 2022-08-30 PROCEDURE — 80061 LIPID PANEL: CPT

## 2022-08-30 PROCEDURE — 80048 BASIC METABOLIC PNL TOTAL CA: CPT

## 2022-09-30 DIAGNOSIS — F41.1 GENERALIZED ANXIETY DISORDER: ICD-10-CM

## 2022-09-30 RX ORDER — PAROXETINE 10 MG/1
10 TABLET, FILM COATED ORAL DAILY
Qty: 90 TABLET | Refills: 0 | Status: SHIPPED | OUTPATIENT
Start: 2022-09-30 | End: 2022-10-10

## 2022-10-10 ENCOUNTER — E-VISIT (OUTPATIENT)
Dept: FAMILY MEDICINE | Facility: CLINIC | Age: 77
End: 2022-10-10
Payer: COMMERCIAL

## 2022-10-10 DIAGNOSIS — F41.1 GENERALIZED ANXIETY DISORDER: ICD-10-CM

## 2022-10-10 PROCEDURE — 99422 OL DIG E/M SVC 11-20 MIN: CPT | Performed by: FAMILY MEDICINE

## 2022-10-10 RX ORDER — PAROXETINE 20 MG/1
20 TABLET, FILM COATED ORAL EVERY MORNING
Qty: 90 TABLET | Refills: 0 | Status: SHIPPED | OUTPATIENT
Start: 2022-10-10 | End: 2023-01-09

## 2022-10-10 ASSESSMENT — ANXIETY QUESTIONNAIRES
1. FEELING NERVOUS, ANXIOUS, OR ON EDGE: SEVERAL DAYS
3. WORRYING TOO MUCH ABOUT DIFFERENT THINGS: SEVERAL DAYS
6. BECOMING EASILY ANNOYED OR IRRITABLE: SEVERAL DAYS
GAD7 TOTAL SCORE: 7
2. NOT BEING ABLE TO STOP OR CONTROL WORRYING: SEVERAL DAYS
4. TROUBLE RELAXING: SEVERAL DAYS
GAD7 TOTAL SCORE: 7
5. BEING SO RESTLESS THAT IT IS HARD TO SIT STILL: SEVERAL DAYS
7. FEELING AFRAID AS IF SOMETHING AWFUL MIGHT HAPPEN: SEVERAL DAYS
GAD7 TOTAL SCORE: 7
7. FEELING AFRAID AS IF SOMETHING AWFUL MIGHT HAPPEN: SEVERAL DAYS
8. IF YOU CHECKED OFF ANY PROBLEMS, HOW DIFFICULT HAVE THESE MADE IT FOR YOU TO DO YOUR WORK, TAKE CARE OF THINGS AT HOME, OR GET ALONG WITH OTHER PEOPLE?: SOMEWHAT DIFFICULT

## 2022-10-11 ASSESSMENT — ANXIETY QUESTIONNAIRES: GAD7 TOTAL SCORE: 7

## 2022-10-29 ENCOUNTER — HEALTH MAINTENANCE LETTER (OUTPATIENT)
Age: 77
End: 2022-10-29

## 2022-12-15 DIAGNOSIS — F41.1 GENERALIZED ANXIETY DISORDER: ICD-10-CM

## 2022-12-15 RX ORDER — PAROXETINE 10 MG/1
10 TABLET, FILM COATED ORAL DAILY
Qty: 90 TABLET | Refills: 1 | Status: SHIPPED | OUTPATIENT
Start: 2022-12-15 | End: 2023-01-18 | Stop reason: DRUGHIGH

## 2023-01-05 DIAGNOSIS — F41.1 GENERALIZED ANXIETY DISORDER: ICD-10-CM

## 2023-01-06 RX ORDER — ALPRAZOLAM 0.25 MG
TABLET ORAL
Qty: 90 TABLET | Refills: 1 | Status: SHIPPED | OUTPATIENT
Start: 2023-01-06 | End: 2023-12-15

## 2023-01-09 DIAGNOSIS — F41.1 GENERALIZED ANXIETY DISORDER: ICD-10-CM

## 2023-01-09 RX ORDER — PAROXETINE 20 MG/1
TABLET, FILM COATED ORAL
Qty: 90 TABLET | Refills: 1 | Status: SHIPPED | OUTPATIENT
Start: 2023-01-09 | End: 2023-08-11

## 2023-01-17 DIAGNOSIS — I10 ESSENTIAL HYPERTENSION WITH GOAL BLOOD PRESSURE LESS THAN 140/90: ICD-10-CM

## 2023-01-17 RX ORDER — AMLODIPINE BESYLATE 10 MG/1
10 TABLET ORAL DAILY
Qty: 90 TABLET | Refills: 0 | OUTPATIENT
Start: 2023-01-17

## 2023-01-18 ENCOUNTER — OFFICE VISIT (OUTPATIENT)
Dept: FAMILY MEDICINE | Facility: CLINIC | Age: 78
End: 2023-01-18
Payer: COMMERCIAL

## 2023-01-18 VITALS
SYSTOLIC BLOOD PRESSURE: 128 MMHG | HEART RATE: 89 BPM | TEMPERATURE: 97.8 F | HEIGHT: 63 IN | RESPIRATION RATE: 16 BRPM | OXYGEN SATURATION: 98 % | DIASTOLIC BLOOD PRESSURE: 66 MMHG | WEIGHT: 156.8 LBS | BODY MASS INDEX: 27.78 KG/M2

## 2023-01-18 DIAGNOSIS — F41.1 GENERALIZED ANXIETY DISORDER: ICD-10-CM

## 2023-01-18 DIAGNOSIS — Z00.00 ENCOUNTER FOR MEDICARE ANNUAL WELLNESS EXAM: Primary | ICD-10-CM

## 2023-01-18 DIAGNOSIS — Z91.89 10 YEAR RISK OF MI OR STROKE 7.5% OR GREATER: ICD-10-CM

## 2023-01-18 DIAGNOSIS — I10 ESSENTIAL HYPERTENSION WITH GOAL BLOOD PRESSURE LESS THAN 140/90: ICD-10-CM

## 2023-01-18 PROCEDURE — G0439 PPPS, SUBSEQ VISIT: HCPCS | Performed by: FAMILY MEDICINE

## 2023-01-18 PROCEDURE — 99214 OFFICE O/P EST MOD 30 MIN: CPT | Mod: 25 | Performed by: FAMILY MEDICINE

## 2023-01-18 PROCEDURE — 80048 BASIC METABOLIC PNL TOTAL CA: CPT | Performed by: FAMILY MEDICINE

## 2023-01-18 PROCEDURE — 36415 COLL VENOUS BLD VENIPUNCTURE: CPT | Performed by: FAMILY MEDICINE

## 2023-01-18 RX ORDER — AMLODIPINE BESYLATE 10 MG/1
10 TABLET ORAL DAILY
Qty: 90 TABLET | Refills: 1 | Status: SHIPPED | OUTPATIENT
Start: 2023-01-18 | End: 2023-08-11

## 2023-01-18 RX ORDER — ATORVASTATIN CALCIUM 10 MG/1
10 TABLET, FILM COATED ORAL DAILY
Qty: 90 TABLET | Refills: 3 | Status: SHIPPED | OUTPATIENT
Start: 2023-01-18 | End: 2024-01-17

## 2023-01-18 RX ORDER — VALSARTAN AND HYDROCHLOROTHIAZIDE 160; 25 MG/1; MG/1
1 TABLET ORAL DAILY
Qty: 90 TABLET | Refills: 1 | Status: SHIPPED | OUTPATIENT
Start: 2023-01-18 | End: 2023-08-11

## 2023-01-18 ASSESSMENT — PAIN SCALES - GENERAL: PAINLEVEL: NO PAIN (0)

## 2023-01-18 ASSESSMENT — ENCOUNTER SYMPTOMS
COUGH: 0
HEADACHES: 0
JOINT SWELLING: 0
MYALGIAS: 1
HEMATURIA: 0
WEAKNESS: 0
DIZZINESS: 0
DYSURIA: 0
SORE THROAT: 0
EYE PAIN: 0
BREAST MASS: 0
NAUSEA: 0
NERVOUS/ANXIOUS: 1
FREQUENCY: 0
SHORTNESS OF BREATH: 0
CONSTIPATION: 0
HEARTBURN: 1
HEMATOCHEZIA: 0
PALPITATIONS: 0
DIARRHEA: 1
CHILLS: 1
FEVER: 0
ABDOMINAL PAIN: 0
PARESTHESIAS: 0

## 2023-01-18 ASSESSMENT — ACTIVITIES OF DAILY LIVING (ADL): CURRENT_FUNCTION: NO ASSISTANCE NEEDED

## 2023-01-18 NOTE — PATIENT INSTRUCTIONS
Patient Education   Personalized Prevention Plan  You are due for the preventive services outlined below.  Your care team is available to assist you in scheduling these services.  If you have already completed any of these items, please share that information with your care team to update in your medical record.  Health Maintenance Due   Topic Date Due    ANNUAL REVIEW OF HM ORDERS  Never done    Zoster (Shingles) Vaccine (2 of 3) 06/23/2015            Consider counseling to help with coping skills/perspective.    JEAN Mcmahon, [facility]  Behavioral Health Clinician  [facility]     Two says to schedule:  1)  - Ask to schedule a NEW appoinment with robina Mcmahon PAPI  2) Scheduling line - Call [phone] and request a NEW appoinment with robina Mcmahon PAPI at the [facility]

## 2023-01-18 NOTE — PROGRESS NOTES
"SUBJECTIVE:   ISRRAEL is a 77 year old who presents for Preventive Visit.    Patient has been advised of split billing requirements and indicates understanding: Yes  Are you in the first 12 months of your Medicare coverage?  No    Healthy Habits:     In general, how would you rate your overall health?  Good    Frequency of exercise:  2-3 days/week    Duration of exercise:  15-30 minutes    Do you usually eat at least 4 servings of fruit and vegetables a day, include whole grains    & fiber and avoid regularly eating high fat or \"junk\" foods?  Yes    Taking medications regularly:  Yes    Medication side effects:  None    Ability to successfully perform activities of daily living:  No assistance needed    Home Safety:  No safety concerns identified    Hearing Impairment:  No hearing concerns    In the past 6 months, have you been bothered by leaking of urine?  No    In general, how would you rate your overall mental or emotional health?  Good      PHQ-2 Total Score: 1    Additional concerns today:  No      Have you ever done Advance Care Planning? (For example, a Health Directive, POLST, or a discussion with a medical provider or your loved ones about your wishes): Yes, patient states has an Advance Care Planning document and will bring a copy to the clinic.       Fall risk  Fallen 2 or more times in the past year?: No  Any fall with injury in the past year?: No    Cognitive Screening Normal cognition based on my direct observation during interview and exam.     Do you have sleep apnea, excessive snoring or daytime drowsiness?: snoring    Reviewed and updated as needed this visit by clinical staff   Tobacco  Allergies  Meds  Problems  Med Hx  Surg Hx  Fam Hx          Reviewed and updated as needed this visit by Provider   Tobacco  Allergies  Meds  Problems  Med Hx  Surg Hx  Fam Hx         Social History     Tobacco Use     Smoking status: Never     Smokeless tobacco: Never   Substance Use Topics     " Alcohol use: Yes     Comment: Wine...on occasion         Alcohol Use 1/18/2023   Prescreen: >3 drinks/day or >7 drinks/week? No   Prescreen: >3 drinks/day or >7 drinks/week? -               Current providers sharing in care for this patient include:   Patient Care Team:  Cira Schwab MD as PCP - General (Family Practice)  Cira Schwab MD as Assigned PCP  Cristina Mora RN as     The following health maintenance items are reviewed in Epic and correct as of today:  Health Maintenance   Topic Date Due     ANNUAL REVIEW OF HM ORDERS  Never done     ZOSTER IMMUNIZATION (2 of 3) 06/23/2015     BMP  02/28/2023     LIPID  08/30/2023     MEDICARE ANNUAL WELLNESS VISIT  01/18/2024     FALL RISK ASSESSMENT  01/18/2024     DTAP/TDAP/TD IMMUNIZATION (3 - Td or Tdap) 08/24/2027     ADVANCE CARE PLANNING  01/18/2028     DEXA  02/02/2032     HEPATITIS C SCREENING  Completed     PHQ-2 (once per calendar year)  Completed     INFLUENZA VACCINE  Completed     Pneumococcal Vaccine: 65+ Years  Completed     COVID-19 Vaccine  Completed     IPV IMMUNIZATION  Aged Out     MENINGITIS IMMUNIZATION  Aged Out     MAMMO SCREENING  Discontinued     COLORECTAL CANCER SCREENING  Discontinued     G 3 P 1   No LMP recorded. (Menstrual status: Tubal ).     Fasting: No   Td: tdap 8/2017       Flu: 10/2022      Covid: Biv 12/2022      Shingrix: zostavac 2015 - discussed shingrix      PPV: done      NO - age 65 - see link Cervical Cytology Screening Guidelines               Cholesterol:   Lab Results   Component Value Date    CHOL 175 08/30/2022    CHOL 199 11/10/2020     Lab Results   Component Value Date    HDL 81 08/30/2022    HDL 88 11/10/2020     Lab Results   Component Value Date    LDL 73 08/30/2022    LDL 96 11/10/2020     Lab Results   Component Value Date    TRIG 107 08/30/2022    TRIG 76 11/10/2020     Lab Results   Component Value Date    CHOLHDLRATIO 3.1 08/16/2013         MMG: 10/2020  Dexa:   2/2017     Flex/colo: 8/2015      Seat Belt: Yes    Sunscreen use: Yes   Calcium Intake: adeq  Health Care Directive: Yes   Sexually Active: No    Current contraception: none  History of abnormal Pap smear: No  Family history of colon/breast/ovarian cancer: No  Regular self breast exam: Yes  History of abnormal mammogram: No      Labs reviewed in EPIC  BP Readings from Last 3 Encounters:   01/18/23 (!) 150/79   04/14/22 128/69   01/18/22 137/74    Wt Readings from Last 3 Encounters:   01/18/23 71.1 kg (156 lb 12.8 oz)   04/14/22 72.4 kg (159 lb 9.6 oz)   01/18/22 70.9 kg (156 lb 6 oz)                  Patient Active Problem List   Diagnosis     Health Care Home     Advanced directives, counseling/discussion     Primary localized osteoarthrosis of lower leg     Generalized anxiety disorder     Essential hypertension with goal blood pressure less than 140/90     Hyperlipidemia LDL goal <100     Past Surgical History:   Procedure Laterality Date     APPENDECTOMY  1973     CHOLECYSTECTOMY  1973     COLONOSCOPY  2015     GALLBLADDER SURGERY       JOINT REPLACEMENT  07/17/2018    left knee     JOINT REPLACEMENT  2016    right knee     ORTHOPEDIC SURGERY  Knee replacement    Nov. 2015     TUBAL LIGATION         Social History     Tobacco Use     Smoking status: Never     Smokeless tobacco: Never   Substance Use Topics     Alcohol use: Yes     Comment: Wine...on occasion     Family History   Problem Relation Age of Onset     Cancer Mother 89        Utreine cancer     Hypertension Mother      Cerebrovascular Disease Mother      Other Cancer Mother      Depression Mother      Anxiety Disorder Mother      Arthritis Father      Cancer Father         Lung cancer     Other Cancer Father          Current Outpatient Medications   Medication Sig Dispense Refill     ALPRAZolam (XANAX) 0.25 MG tablet TAKE 1 TABLET BY MOUTH NIGHTLY AS NEEDED FOR ANXIETY. 90 tablet 1     amLODIPine (NORVASC) 10 MG tablet TAKE 1 TABLET (10 MG) BY MOUTH  DAILY. 90 tablet 0     aspirin 81 MG tablet Take by mouth daily       atorvastatin (LIPITOR) 10 MG tablet Take 1 tablet (10 mg) by mouth daily 90 tablet 3     Calcium-Magnesium-Vitamin D 600-300-100 LIQD        multivitamin w/minerals (THERA-VIT-M) tablet Take 1 tablet by mouth daily       NONFORMULARY 2 times daily Hemp oil       omega-3 fatty acids 1200 MG capsule Take 1 capsule by mouth daily       PARoxetine (PAXIL) 20 MG tablet TAKE 1 TABLET BY MOUTH EVERY MORNING 90 tablet 1     Turmeric 500 MG CAPS        valsartan-hydrochlorothiazide (DIOVAN HCT) 160-25 MG tablet Take 1 tablet by mouth daily 90 tablet 1     Vitamin D, Cholecalciferol, 1000 UNITS CAPS Take 1 capsule by mouth daily       vitamin E 400 UNIT capsule Take by mouth daily       PARoxetine (PAXIL) 10 MG tablet TAKE 1 TABLET (10 MG) BY MOUTH DAILY. (Patient not taking: Reported on 1/18/2023) 90 tablet 1         Mammogram Screening - Patient over age 75, has elected to continue with screening.  Pertinent mammograms are reviewed under the imaging tab.    Review of Systems   Constitutional: Positive for chills. Negative for fever.   HENT: Negative for congestion, ear pain, hearing loss and sore throat.    Eyes: Positive for visual disturbance. Negative for pain.   Respiratory: Negative for cough and shortness of breath.    Cardiovascular: Negative for chest pain, palpitations and peripheral edema.   Gastrointestinal: Positive for diarrhea and heartburn. Negative for abdominal pain, constipation, hematochezia and nausea.   Breasts:  Negative for tenderness, breast mass and discharge.   Genitourinary: Negative for dysuria, frequency, genital sores, hematuria, pelvic pain, urgency, vaginal bleeding and vaginal discharge.   Musculoskeletal: Positive for myalgias. Negative for joint swelling.   Skin: Negative for rash.   Neurological: Negative for dizziness, weakness, headaches and paresthesias.   Psychiatric/Behavioral: Negative for mood changes. The  "patient is nervous/anxious.          OBJECTIVE:   BP (!) 150/79   Pulse 89   Temp 97.8  F (36.6  C) (Oral)   Resp 16   Ht 1.6 m (5' 3\")   Wt 71.1 kg (156 lb 12.8 oz)   SpO2 98%   BMI 27.78 kg/m   Estimated body mass index is 27.78 kg/m  as calculated from the following:    Height as of this encounter: 1.6 m (5' 3\").    Weight as of this encounter: 71.1 kg (156 lb 12.8 oz).  Physical Exam  GENERAL APPEARANCE: healthy, alert and no distress  EYES: Eyes grossly normal to inspection, PERRL and conjunctivae and sclerae normal  HENT: ear canals and TM's normal, nose and mouth without ulcers or lesions, oropharynx clear and oral mucous membranes moist  NECK: no adenopathy, no asymmetry, masses, or scars and thyroid normal to palpation  RESP: lungs clear to auscultation - no rales, rhonchi or wheezes  BREAST: normal without masses, tenderness or nipple discharge and no palpable axillary masses or adenopathy  CV: regular rate and rhythm, normal S1 S2, no S3 or S4, no murmur, click or rub, no peripheral edema and peripheral pulses strong  ABDOMEN: soft, nontender, no hepatosplenomegaly, no masses and bowel sounds normal  MS: no musculoskeletal defects are noted and gait is age appropriate without ataxia  SKIN: no suspicious lesions or rashes  NEURO: Normal strength and tone, sensory exam grossly normal, mentation intact and speech normal  PSYCH: mentation appears normal and affect normal/bright    Diagnostic Test Results:  Labs reviewed in Epic    ASSESSMENT / PLAN:   (Z00.00) Encounter for Medicare annual wellness exam  (primary encounter diagnosis)  Comment: preventive needs reviewed   Plan: see orders in Epic.     (I10) Essential hypertension with goal blood pressure less than 140/90  Comment: to goal  Plan: Basic metabolic panel  (Ca, Cl, CO2, Creat,         Gluc, K, Na, BUN), amLODIPine (NORVASC) 10 MG         tablet, valsartan-hydrochlorothiazide (DIOVAN         HCT) 160-25 MG tablet, OFFICE/OUTPT         " "VISIT,ESTLEVL IV        Continue current meds   Refill x 6 months     (F41.1) Generalized anxiety disorder  Comment: worsened due to spouse's health issues  Plan: OFFICE/OUTPT VISIT,EST,LEVL IV        Spouse has aortic aneurysm and that is creating significant stress.  Offered [facility], contact info given    (Z28.43) 10 year risk of MI or stroke 7.5% or greater  Comment: to goal  Plan: atorvastatin (LIPITOR) 10 MG tablet,         OFFICE/OUTPT VISIT,EST,LEVL IV        Refill x 1 yr             COUNSELING:  Reviewed preventive health counseling, as reflected in patient instructions  Special attention given to:       Regular exercise       Healthy diet/nutrition      BMI:   Estimated body mass index is 27.78 kg/m  as calculated from the following:    Height as of this encounter: 1.6 m (5' 3\").    Weight as of this encounter: 71.1 kg (156 lb 12.8 oz).         She reports that she has never smoked. She has never used smokeless tobacco.      Appropriate preventive services were discussed with this patient, including applicable screening as appropriate for cardiovascular disease, diabetes, osteopenia/osteoporosis, and glaucoma.  As appropriate for age/gender, discussed screening for colorectal cancer, prostate cancer, breast cancer, and cervical cancer. Checklist reviewing preventive services available has been given to the patient.    Reviewed patients plan of care and provided an AVS. The Intermediate Care Plan ( asthma action plan, low back pain action plan, and migraine action plan) for Jenifer meets the Care Plan requirement. This Care Plan has been established and reviewed with the Patient.          Cira Schwab MD  [employer]    Identified Health Risks:  "

## 2023-01-19 LAB
ANION GAP SERPL CALCULATED.3IONS-SCNC: 11 MMOL/L (ref 3–14)
BUN SERPL-MCNC: 17 MG/DL (ref 7–30)
CALCIUM SERPL-MCNC: 9.5 MG/DL (ref 8.5–10.1)
CHLORIDE BLD-SCNC: 100 MMOL/L (ref 94–109)
CO2 SERPL-SCNC: 27 MMOL/L (ref 20–32)
CREAT SERPL-MCNC: 0.7 MG/DL (ref 0.52–1.04)
GFR SERPL CREATININE-BSD FRML MDRD: 89 ML/MIN/1.73M2
GLUCOSE BLD-MCNC: 110 MG/DL (ref 70–99)
POTASSIUM BLD-SCNC: 3.7 MMOL/L (ref 3.4–5.3)
SODIUM SERPL-SCNC: 138 MMOL/L (ref 133–144)

## 2023-08-10 DIAGNOSIS — I10 ESSENTIAL HYPERTENSION WITH GOAL BLOOD PRESSURE LESS THAN 140/90: ICD-10-CM

## 2023-08-10 DIAGNOSIS — F41.1 GENERALIZED ANXIETY DISORDER: ICD-10-CM

## 2023-08-11 RX ORDER — AMLODIPINE BESYLATE 10 MG/1
10 TABLET ORAL DAILY
Qty: 90 TABLET | Refills: 0 | Status: SHIPPED | OUTPATIENT
Start: 2023-08-11 | End: 2023-11-17

## 2023-08-11 RX ORDER — PAROXETINE 20 MG/1
20 TABLET, FILM COATED ORAL EVERY MORNING
Qty: 90 TABLET | Refills: 0 | Status: SHIPPED | OUTPATIENT
Start: 2023-08-11 | End: 2023-11-11

## 2023-08-11 RX ORDER — VALSARTAN AND HYDROCHLOROTHIAZIDE 160; 25 MG/1; MG/1
1 TABLET ORAL DAILY
Qty: 90 TABLET | Refills: 1 | Status: SHIPPED | OUTPATIENT
Start: 2023-08-11 | End: 2024-01-17

## 2023-11-11 DIAGNOSIS — I10 ESSENTIAL HYPERTENSION WITH GOAL BLOOD PRESSURE LESS THAN 140/90: ICD-10-CM

## 2023-11-11 DIAGNOSIS — E78.5 HYPERLIPIDEMIA LDL GOAL <100: Primary | ICD-10-CM

## 2023-11-11 DIAGNOSIS — F41.1 GENERALIZED ANXIETY DISORDER: ICD-10-CM

## 2023-11-11 RX ORDER — PAROXETINE 20 MG/1
20 TABLET, FILM COATED ORAL EVERY MORNING
Qty: 90 TABLET | Refills: 0 | Status: SHIPPED | OUTPATIENT
Start: 2023-11-11 | End: 2024-01-17

## 2023-11-17 DIAGNOSIS — I10 ESSENTIAL HYPERTENSION WITH GOAL BLOOD PRESSURE LESS THAN 140/90: ICD-10-CM

## 2023-11-17 RX ORDER — AMLODIPINE BESYLATE 10 MG/1
10 TABLET ORAL DAILY
Qty: 90 TABLET | Refills: 0 | Status: SHIPPED | OUTPATIENT
Start: 2023-11-17 | End: 2024-01-17

## 2023-12-15 DIAGNOSIS — F41.1 GENERALIZED ANXIETY DISORDER: ICD-10-CM

## 2023-12-15 RX ORDER — ALPRAZOLAM 0.25 MG
TABLET ORAL
Qty: 90 TABLET | Refills: 0 | Status: SHIPPED | OUTPATIENT
Start: 2023-12-15 | End: 2024-07-24

## 2024-01-17 ENCOUNTER — OFFICE VISIT (OUTPATIENT)
Dept: FAMILY MEDICINE | Facility: CLINIC | Age: 79
End: 2024-01-17
Payer: COMMERCIAL

## 2024-01-17 ENCOUNTER — LAB (OUTPATIENT)
Dept: LAB | Facility: CLINIC | Age: 79
End: 2024-01-17
Payer: COMMERCIAL

## 2024-01-17 VITALS
OXYGEN SATURATION: 94 % | TEMPERATURE: 97.7 F | BODY MASS INDEX: 26.79 KG/M2 | HEIGHT: 62 IN | DIASTOLIC BLOOD PRESSURE: 78 MMHG | WEIGHT: 145.6 LBS | SYSTOLIC BLOOD PRESSURE: 131 MMHG | HEART RATE: 75 BPM | RESPIRATION RATE: 18 BRPM

## 2024-01-17 DIAGNOSIS — Z00.00 ENCOUNTER FOR MEDICARE ANNUAL WELLNESS EXAM: Primary | ICD-10-CM

## 2024-01-17 DIAGNOSIS — I10 ESSENTIAL HYPERTENSION WITH GOAL BLOOD PRESSURE LESS THAN 140/90: ICD-10-CM

## 2024-01-17 DIAGNOSIS — E78.5 HYPERLIPIDEMIA LDL GOAL <100: ICD-10-CM

## 2024-01-17 DIAGNOSIS — Z91.89 10 YEAR RISK OF MI OR STROKE 7.5% OR GREATER: ICD-10-CM

## 2024-01-17 DIAGNOSIS — Z12.31 VISIT FOR SCREENING MAMMOGRAM: ICD-10-CM

## 2024-01-17 DIAGNOSIS — F41.1 GENERALIZED ANXIETY DISORDER: ICD-10-CM

## 2024-01-17 PROCEDURE — 80061 LIPID PANEL: CPT

## 2024-01-17 PROCEDURE — 80048 BASIC METABOLIC PNL TOTAL CA: CPT

## 2024-01-17 PROCEDURE — 99214 OFFICE O/P EST MOD 30 MIN: CPT | Mod: 25 | Performed by: FAMILY MEDICINE

## 2024-01-17 PROCEDURE — 36415 COLL VENOUS BLD VENIPUNCTURE: CPT

## 2024-01-17 PROCEDURE — G0439 PPPS, SUBSEQ VISIT: HCPCS | Performed by: FAMILY MEDICINE

## 2024-01-17 RX ORDER — PAROXETINE 20 MG/1
20 TABLET, FILM COATED ORAL EVERY MORNING
Qty: 90 TABLET | Refills: 1 | Status: SHIPPED | OUTPATIENT
Start: 2024-01-17 | End: 2024-07-24

## 2024-01-17 RX ORDER — AMLODIPINE BESYLATE 10 MG/1
10 TABLET ORAL DAILY
Qty: 90 TABLET | Refills: 1 | Status: SHIPPED | OUTPATIENT
Start: 2024-01-17 | End: 2024-07-24

## 2024-01-17 RX ORDER — ATORVASTATIN CALCIUM 10 MG/1
10 TABLET, FILM COATED ORAL DAILY
Qty: 90 TABLET | Refills: 3 | Status: SHIPPED | OUTPATIENT
Start: 2024-01-17

## 2024-01-17 RX ORDER — RESPIRATORY SYNCYTIAL VIRUS VACCINE 120MCG/0.5
0.5 KIT INTRAMUSCULAR ONCE
Qty: 1 EACH | Refills: 0 | Status: CANCELLED | OUTPATIENT
Start: 2024-01-17 | End: 2024-01-17

## 2024-01-17 RX ORDER — VALSARTAN AND HYDROCHLOROTHIAZIDE 160; 25 MG/1; MG/1
1 TABLET ORAL DAILY
Qty: 90 TABLET | Refills: 1 | Status: SHIPPED | OUTPATIENT
Start: 2024-01-17 | End: 2024-07-24

## 2024-01-17 ASSESSMENT — ENCOUNTER SYMPTOMS
HEARTBURN: 0
NERVOUS/ANXIOUS: 0
ARTHRALGIAS: 1
HEADACHES: 0
CONSTIPATION: 0
DIARRHEA: 1
JOINT SWELLING: 0
COUGH: 0
MYALGIAS: 1
PARESTHESIAS: 1
FEVER: 0
HEMATURIA: 0
FREQUENCY: 0
ABDOMINAL PAIN: 0
CHILLS: 1
HEMATOCHEZIA: 0
WEAKNESS: 0
NAUSEA: 0
SHORTNESS OF BREATH: 0
PALPITATIONS: 0
EYE PAIN: 0
DIZZINESS: 0
DYSURIA: 0
SORE THROAT: 0

## 2024-01-17 ASSESSMENT — ACTIVITIES OF DAILY LIVING (ADL): CURRENT_FUNCTION: NO ASSISTANCE NEEDED

## 2024-01-17 ASSESSMENT — PAIN SCALES - GENERAL: PAINLEVEL: EXTREME PAIN (8)

## 2024-01-17 NOTE — PROGRESS NOTES
"Preventive Care Visit  [facility]  Cira Schwab MD, Family Medicine  Jan 17, 2024      SUBJECTIVE:   ISRRAEL is a 78 year old, presenting for the following:  Physical        1/17/2024    10:28 AM   Additional Questions   Roomed by doreen   Accompanied by RUSSELL     Are you in the first 12 months of your Medicare coverage?  No    Healthy Habits:     In general, how would you rate your overall health?  Good    Frequency of exercise:  2-3 days/week    Duration of exercise:  15-30 minutes    Do you usually eat at least 4 servings of fruit and vegetables a day, include whole grains    & fiber and avoid regularly eating high fat or \"junk\" foods?  Yes    Taking medications regularly:  Yes    Medication side effects:  None    Ability to successfully perform activities of daily living:  No assistance needed    Home Safety:  No safety concerns identified    Hearing Impairment:  Difficulty understanding soft or whispered speech    In the past 6 months, have you been bothered by leaking of urine?  No    In general, how would you rate your overall mental or emotional health?  Good    Additional concerns today:  No      Today's PHQ-2 Score:       1/17/2024    10:22 AM   PHQ-2 ( 1999 [employer])   Q1: Little interest or pleasure in doing things 0   Q2: Feeling down, depressed or hopeless 0   PHQ-2 Score 0   Q1: Little interest or pleasure in doing things Not at all   Q2: Feeling down, depressed or hopeless Not at all   PHQ-2 Score 0           Have you ever done Advance Care Planning? (For example, a Health Directive, POLST, or a discussion with a medical provider or your loved ones about your wishes): Yes, patient states has an Advance Care Planning document and will bring a copy to the clinic.       Fall risk  Fallen 2 or more times in the past year?: No  Any fall with injury in the past year?: No    Cognitive Screening Normal cognition based on my direct observation during interview and exam.         Reviewed " and updated as needed this visit by clinical staff   Tobacco  Allergies  Meds  Problems  Med Hx  Surg Hx  Fam Hx          Reviewed and updated as needed this visit by Provider   Tobacco  Allergies  Meds  Problems  Med Hx  Surg Hx  Fam Hx          Social History     Tobacco Use    Smoking status: Never    Smokeless tobacco: Never   Substance Use Topics    Alcohol use: Yes     Comment: Wine...on occasion             1/17/2024    10:21 AM   Alcohol Use   Prescreen: >3 drinks/day or >7 drinks/week? No     Do you have a current opioid prescription? No  Do you use any other controlled substances or medications that are not prescribed by a provider? None              Current providers sharing in care for this patient include:   Patient Care Team:  Cira Schwab MD as PCP - General (Family Practice)  Cira Schwab MD as Assigned PCP  Cristina Mora RN as     The following health maintenance items are reviewed in Epic and correct as of today:  Health Maintenance   Topic Date Due    ANNUAL REVIEW OF HM ORDERS  Never done    RSV VACCINE (Pregnancy & 60+) (1 - 1-dose 60+ series) Never done    BMP  07/18/2023    LIPID  08/30/2023    COVID-19 Vaccine (6 - 2023-24 season) 09/01/2023    MEDICARE ANNUAL WELLNESS VISIT  01/18/2024    FALL RISK ASSESSMENT  01/17/2025    DTAP/TDAP/TD IMMUNIZATION (3 - Td or Tdap) 08/24/2027    ADVANCE CARE PLANNING  01/17/2029    DEXA  02/02/2032    HEPATITIS C SCREENING  Completed    PHQ-2 (once per calendar year)  Completed    INFLUENZA VACCINE  Completed    Pneumococcal Vaccine: 65+ Years  Completed    ZOSTER IMMUNIZATION  Completed    IPV IMMUNIZATION  Aged Out    HPV IMMUNIZATION  Aged Out    MENINGITIS IMMUNIZATION  Aged Out    RSV MONOCLONAL ANTIBODY  Aged Out    MAMMO SCREENING  Discontinued    COLORECTAL CANCER SCREENING  Discontinued       G 3 P 1   No LMP recorded. (Menstrual status: Tubal ).     Fasting: Yes    Td:tdap 8/2017       Flu:  10/2023      Covid: 12/2022      Shingrix: done      PPV: done      NO - age 65 - see link Cervical Cytology Screening Guidelines               Cholesterol:   Lab Results   Component Value Date    CHOL 175 08/30/2022    CHOL 199 11/10/2020     Lab Results   Component Value Date    HDL 81 08/30/2022    HDL 88 11/10/2020     Lab Results   Component Value Date    LDL 73 08/30/2022    LDL 96 11/10/2020     Lab Results   Component Value Date    TRIG 107 08/30/2022    TRIG 76 11/10/2020     Lab Results   Component Value Date    CHOLHDLRATIO 3.1 08/16/2013         MMG: 10/2020  Dexa:  2/20217     Flex/colo: 8/2015 no further screening      Seat Belt: Yes    Sunscreen use: Yes   Calcium Intake: adeq  Health Care Directive: Yes   Sexually Active: No    Current contraception: none  History of abnormal Pap smear: No  Family history of colon/breast/ovarian cancer: No  Regular self breast exam: Yes  History of abnormal mammogram: No      Labs reviewed in EPIC  BP Readings from Last 3 Encounters:   01/17/24 131/78   01/18/23 128/66   04/14/22 128/69    Wt Readings from Last 3 Encounters:   01/17/24 66 kg (145 lb 9.6 oz)   01/18/23 71.1 kg (156 lb 12.8 oz)   04/14/22 72.4 kg (159 lb 9.6 oz)                  Patient Active Problem List   Diagnosis    Health Care Home    Advanced directives, counseling/discussion    Primary localized osteoarthrosis of lower leg    Generalized anxiety disorder    Essential hypertension with goal blood pressure less than 140/90    Hyperlipidemia LDL goal <100     Past Surgical History:   Procedure Laterality Date    APPENDECTOMY  1973    CHOLECYSTECTOMY  1973    COLONOSCOPY  2015    GALLBLADDER SURGERY      JOINT REPLACEMENT  07/17/2018    left knee    JOINT REPLACEMENT  2016    right knee    ORTHOPEDIC SURGERY  Knee replacement    Nov. 2015    TUBAL LIGATION         Social History     Tobacco Use    Smoking status: Never    Smokeless tobacco: Never   Substance Use Topics    Alcohol use: Yes      "Comment: Wine...on occasion     Family History   Problem Relation Age of Onset    Cancer Mother 89        Utreine cancer    Hypertension Mother     Cerebrovascular Disease Mother     Other Cancer Mother     Depression Mother     Anxiety Disorder Mother     Arthritis Father     Cancer Father         Lung cancer    Other Cancer Father          Current Outpatient Medications   Medication Sig Dispense Refill    ALPRAZolam (XANAX) 0.25 MG tablet TAKE 1 TABLET BY MOUTH NIGHTLY AS NEEDED FOR ANXIETY. 90 tablet 0    amLODIPine (NORVASC) 10 MG tablet Take 1 tablet (10 mg) by mouth daily 90 tablet 1    aspirin 81 MG tablet Take by mouth daily      atorvastatin (LIPITOR) 10 MG tablet Take 1 tablet (10 mg) by mouth daily 90 tablet 3    Calcium-Magnesium-Vitamin D 600-300-100 LIQD       multivitamin w/minerals (THERA-VIT-M) tablet Take 1 tablet by mouth daily      NONFORMULARY 2 times daily Hemp oil      omega-3 fatty acids 1200 MG capsule Take 1 capsule by mouth daily      PARoxetine (PAXIL) 20 MG tablet Take 1 tablet (20 mg) by mouth every morning 90 tablet 1    Turmeric 500 MG CAPS       valsartan-hydrochlorothiazide (DIOVAN HCT) 160-25 MG tablet Take 1 tablet by mouth daily 90 tablet 1    Vitamin D, Cholecalciferol, 1000 UNITS CAPS Take 1 capsule by mouth daily      vitamin E 400 UNIT capsule Take by mouth daily           Mammogram Screening - Patient over age 75, has elected to continue with screening.  Pertinent mammograms are reviewed under the imaging tab.      OBJECTIVE:   /78   Pulse 75   Temp 97.7  F (36.5  C) (Tympanic)   Resp 18   Ht 1.585 m (5' 2.4\")   Wt 66 kg (145 lb 9.6 oz)   SpO2 94%   BMI 26.29 kg/m     Estimated body mass index is 26.29 kg/m  as calculated from the following:    Height as of this encounter: 1.585 m (5' 2.4\").    Weight as of this encounter: 66 kg (145 lb 9.6 oz).  Review of Systems   Constitutional:  Positive for chills. Negative for fever.   HENT:  Negative for congestion, ear " pain, hearing loss and sore throat.    Eyes:  Positive for visual disturbance. Negative for pain.   Respiratory:  Negative for cough and shortness of breath.    Cardiovascular:  Negative for chest pain and palpitations.   Gastrointestinal:  Positive for diarrhea. Negative for abdominal pain, constipation and nausea.   Genitourinary:  Negative for dysuria, frequency, genital sores, hematuria and urgency.   Musculoskeletal:  Positive for arthralgias and myalgias. Negative for joint swelling.   Skin:  Negative for rash.   Neurological:  Negative for dizziness, weakness and headaches.   Psychiatric/Behavioral:  The patient is not nervous/anxious.         Physical Exam  GENERAL: alert and no distress  EYES: Eyes grossly normal to inspection, PERRL and conjunctivae and sclerae normal  HENT: ear canals and TM's normal, nose and mouth without ulcers or lesions  NECK: no adenopathy, no asymmetry, masses, or scars  RESP: lungs clear to auscultation - no rales, rhonchi or wheezes  CV: regular rate and rhythm, normal S1 S2, no S3 or S4, no murmur, click or rub, no peripheral edema  ABDOMEN: soft, nontender, no hepatosplenomegaly, no masses and bowel sounds normal  MS: no gross musculoskeletal defects noted, no edema  SKIN: no suspicious lesions or rashes  NEURO: Normal strength and tone, mentation intact and speech normal  PSYCH: mentation appears normal, affect normal/bright  LYMPH: no cervical, supraclavicular, axillary, or inguinal adenopathy    Diagnostic Test Results:  Labs reviewed in Epic    ASSESSMENT / PLAN:   (Z00.00) Encounter for Medicare annual wellness exam  (primary encounter diagnosis)  Comment: preventive needs reviewed   Plan: see orders in Epic.     (I10) Essential hypertension with goal blood pressure less than 140/90  Comment: to goal  Plan: amLODIPine (NORVASC) 10 MG tablet,         valsartan-hydrochlorothiazide (DIOVAN HCT)         160-25 MG tablet        Continue all medications.   Refill x 6 months  f/u  "6 months for labs     (F41.1) Generalized anxiety disorder  Comment: controlled  Plan: PARoxetine (PAXIL) 20 MG tablet         Refill x 6 months   Continue prn xanax, recent refill    (Z91.89) 10 year risk of MI or stroke 7.5% or greater  Comment: to goal  Plan: atorvastatin (LIPITOR) 10 MG tablet        Refill x 1 yr     (Z12.31) Visit for screening mammogram  Comment: due  Plan: MA Screen Bilateral w/Carlos                  Counseling  Reviewed preventive health counseling, as reflected in patient instructions  Special attention given to:       Regular exercise       Healthy diet/nutrition      BMI  Estimated body mass index is 26.29 kg/m  as calculated from the following:    Height as of this encounter: 1.585 m (5' 2.4\").    Weight as of this encounter: 66 kg (145 lb 9.6 oz).         She reports that she has never smoked. She has never used smokeless tobacco.      Appropriate preventive services were discussed with this patient, including applicable screening as appropriate for fall prevention, nutrition, physical activity, Tobacco-use cessation, weight loss and cognition.  Checklist reviewing preventive services available has been given to the patient.    Reviewed patients plan of care and provided an AVS. The Complex Care Plan (for patients with higher acuity and needing more deliberate coordination of services) for Jenifer meets the Care Plan requirement. This Care Plan has been established and reviewed with the Patient.          Signed Electronically by: Cira Schwab MD    Identified Health Risks  I have reviewed Opioid Use Disorder and Substance Use Disorder risk factors and made any needed referrals.   "

## 2024-01-17 NOTE — PATIENT INSTRUCTIONS
Patient Education   Personalized Prevention Plan  You are due for the preventive services outlined below.  Your care team is available to assist you in scheduling these services.  If you have already completed any of these items, please share that information with your care team to update in your medical record.  Health Maintenance Due   Topic Date Due    ANNUAL REVIEW OF HM ORDERS  Never done    RSV VACCINE (Pregnancy & 60+) (1 - 1-dose 60+ series) Never done    Basic Metabolic Panel  07/18/2023    Cholesterol Lab  08/30/2023    COVID-19 Vaccine (6 - 2023-24 season) 09/01/2023          Please call [phone] to schedule your mammogram.

## 2024-01-17 NOTE — COMMUNITY RESOURCES LIST (ENGLISH)
01/17/2024   [facility] [employer]  N/A  For questions about this resource list or additional care needs, please contact your primary care clinic or care manager.  Phone: [phone]   Email: N/A   Address: [address] [place] [id number]   Hours: N/A        Financial Stability       Utility payment assistance  1  [facility]  Office - [facility] Distance: 8.99 miles      Phone/Virtual   [address] [address]  Language: English  Hours: Mon - Fri 8:30 AM - 12:00 PM , Mon - Fri 1:00 PM - 4:00 PM  Fees: Free   Phone: (947) 367-7551 Email: [email or web address] Website: [email or web address]/     2  [facility] [employer] ([employer]) - Energy Assistance Program Distance: 9 miles      In-Person, Phone/Virtual   [address] [address]  Language: English  Hours: Mon - Fri 8:00 AM - 4:30 PM  Fees: Free   Phone: (976) 753-7549 Email: accap@accap.org Website: http://www.accap.org          Important Numbers & Websites       Emergency Services   911  [facility] Services   311  Poison Control   (990) 876-8089  Suicide Prevention Lifeline   (163) 702-3621 (TALK)  Child Abuse Hotline   (772) 845-7619 (4-A-Child)  Sexual Assault Hotline   (668) 628-3626 (HOPE)  National Runaway Safeline   (859) 575-7618 (RUNAWAY)  All-Options Talkline   (989) 145-8244  Substance Abuse Referral   (182) 381-1408 (HELP)

## 2024-01-18 DIAGNOSIS — R73.03 PREDIABETES: ICD-10-CM

## 2024-01-18 DIAGNOSIS — R73.09 ELEVATED GLUCOSE: Primary | ICD-10-CM

## 2024-01-18 LAB
ANION GAP SERPL CALCULATED.3IONS-SCNC: 11 MMOL/L (ref 7–15)
BUN SERPL-MCNC: 19.5 MG/DL (ref 8–23)
CALCIUM SERPL-MCNC: 9.8 MG/DL (ref 8.8–10.2)
CHLORIDE SERPL-SCNC: 98 MMOL/L (ref 98–107)
CHOLEST SERPL-MCNC: 198 MG/DL
CREAT SERPL-MCNC: 0.75 MG/DL (ref 0.51–0.95)
DEPRECATED HCO3 PLAS-SCNC: 29 MMOL/L (ref 22–29)
EGFRCR SERPLBLD CKD-EPI 2021: 81 ML/MIN/1.73M2
FASTING STATUS PATIENT QL REPORTED: YES
GLUCOSE SERPL-MCNC: 115 MG/DL (ref 70–99)
HDLC SERPL-MCNC: 94 MG/DL
LDLC SERPL CALC-MCNC: 89 MG/DL
NONHDLC SERPL-MCNC: 104 MG/DL
POTASSIUM SERPL-SCNC: 3.9 MMOL/L (ref 3.4–5.3)
SODIUM SERPL-SCNC: 138 MMOL/L (ref 135–145)
TRIGL SERPL-MCNC: 73 MG/DL

## 2024-02-01 ENCOUNTER — LAB (OUTPATIENT)
Dept: LAB | Facility: CLINIC | Age: 79
End: 2024-02-01
Payer: COMMERCIAL

## 2024-02-01 DIAGNOSIS — R73.09 ELEVATED GLUCOSE: ICD-10-CM

## 2024-02-01 LAB — HBA1C MFR BLD: 5.9 % (ref 0–5.6)

## 2024-02-01 PROCEDURE — 36415 COLL VENOUS BLD VENIPUNCTURE: CPT

## 2024-02-01 PROCEDURE — 83036 HEMOGLOBIN GLYCOSYLATED A1C: CPT

## 2024-02-06 PROBLEM — R73.03 PREDIABETES: Status: ACTIVE | Noted: 2024-02-06

## 2024-02-27 ENCOUNTER — MYC MEDICAL ADVICE (OUTPATIENT)
Dept: FAMILY MEDICINE | Facility: CLINIC | Age: 79
End: 2024-02-27
Payer: COMMERCIAL

## 2024-07-23 DIAGNOSIS — I10 ESSENTIAL HYPERTENSION WITH GOAL BLOOD PRESSURE LESS THAN 140/90: ICD-10-CM

## 2024-07-23 DIAGNOSIS — F41.1 GENERALIZED ANXIETY DISORDER: ICD-10-CM

## 2024-07-24 ENCOUNTER — TELEPHONE (OUTPATIENT)
Dept: FAMILY MEDICINE | Facility: CLINIC | Age: 79
End: 2024-07-24
Payer: COMMERCIAL

## 2024-07-24 RX ORDER — ALPRAZOLAM 0.25 MG
TABLET ORAL
Qty: 90 TABLET | Refills: 0 | Status: SHIPPED | OUTPATIENT
Start: 2024-07-24

## 2024-07-24 RX ORDER — AMLODIPINE BESYLATE 10 MG/1
10 TABLET ORAL DAILY
Qty: 90 TABLET | Refills: 1 | Status: SHIPPED | OUTPATIENT
Start: 2024-07-24

## 2024-07-24 RX ORDER — VALSARTAN AND HYDROCHLOROTHIAZIDE 160; 25 MG/1; MG/1
1 TABLET ORAL DAILY
Qty: 90 TABLET | Refills: 1 | Status: SHIPPED | OUTPATIENT
Start: 2024-07-24

## 2024-07-24 RX ORDER — PAROXETINE 20 MG/1
20 TABLET, FILM COATED ORAL EVERY MORNING
Qty: 90 TABLET | Refills: 1 | Status: SHIPPED | OUTPATIENT
Start: 2024-07-24

## 2024-07-24 NOTE — TELEPHONE ENCOUNTER
Prior Authorization Retail Medication Request    Medication/Dose: Alprazolam 0.25 mg tablet  Diagnosis and ICD code (if different than what is on RX): Generalized anxiety disorder [F41.1]    New/renewal/insurance change PA/secondary ins. PA:  Previously Tried and Failed:    Rationale:  Insurance requires PA for maintenance use.    Insurance   Primary: "[employer] [facility] [address]  and [facility]  Insurance ID:  [id number]     Secondary (if applicable):  Insurance ID:      Pharmacy Information (if different than what is on RX)  Name:  [facility]  Phone:  [phone]  Fax:  [phone]

## 2024-07-29 NOTE — TELEPHONE ENCOUNTER
Retail Pharmacy Prior Authorization Team   Phone: [phone]    PA Initiation    Medication: ALPRAZOLAM 0.25 MG PO TABS  Insurance Company: [employer] - Phone [phone] Fax [phone]  Pharmacy Filling the Rx: [id number] - [place] - [address] [facility]  Filling Pharmacy Phone: [phone]  Filling Pharmacy Fax:    Start Date: 7/29/2024

## 2024-07-29 NOTE — TELEPHONE ENCOUNTER
Prior Authorization Approval    Authorization Effective Date: 7/29/2024  Authorization Expiration Date: 7/29/2025  Medication: Alprazolam 0.25 mg tablet-APPROVED  Reference #:     Insurance Company: [employer] - Phone [phone] Fax [phone]  Which Pharmacy is filling the prescription (Not needed for infusion/clinic administered): [id number] - [place], [place] - [address] [facility]  Pharmacy Notified: Yes  Patient Notified: Instructed pharmacy to notify patient when script is ready to /ship.

## 2024-10-15 ENCOUNTER — LAB (OUTPATIENT)
Dept: LAB | Facility: CLINIC | Age: 79
End: 2024-10-15
Payer: COMMERCIAL

## 2024-10-15 DIAGNOSIS — I10 ESSENTIAL HYPERTENSION WITH GOAL BLOOD PRESSURE LESS THAN 140/90: ICD-10-CM

## 2024-10-15 LAB
ANION GAP SERPL CALCULATED.3IONS-SCNC: 16 MMOL/L (ref 7–15)
BUN SERPL-MCNC: 18.1 MG/DL (ref 8–23)
CALCIUM SERPL-MCNC: 9.9 MG/DL (ref 8.8–10.4)
CHLORIDE SERPL-SCNC: 102 MMOL/L (ref 98–107)
CREAT SERPL-MCNC: 0.79 MG/DL (ref 0.51–0.95)
EGFRCR SERPLBLD CKD-EPI 2021: 76 ML/MIN/1.73M2
GLUCOSE SERPL-MCNC: 120 MG/DL (ref 70–99)
HCO3 SERPL-SCNC: 25 MMOL/L (ref 22–29)
POTASSIUM SERPL-SCNC: 3.5 MMOL/L (ref 3.4–5.3)
SODIUM SERPL-SCNC: 143 MMOL/L (ref 135–145)

## 2024-10-15 PROCEDURE — 36415 COLL VENOUS BLD VENIPUNCTURE: CPT

## 2024-10-15 PROCEDURE — 80048 BASIC METABOLIC PNL TOTAL CA: CPT

## 2025-01-27 ENCOUNTER — OFFICE VISIT (OUTPATIENT)
Dept: FAMILY MEDICINE | Facility: CLINIC | Age: 80
End: 2025-01-27
Attending: FAMILY MEDICINE
Payer: COMMERCIAL

## 2025-01-27 VITALS
OXYGEN SATURATION: 98 % | WEIGHT: 136 LBS | HEART RATE: 88 BPM | BODY MASS INDEX: 24.1 KG/M2 | TEMPERATURE: 97.6 F | HEIGHT: 63 IN | SYSTOLIC BLOOD PRESSURE: 137 MMHG | DIASTOLIC BLOOD PRESSURE: 77 MMHG

## 2025-01-27 DIAGNOSIS — R73.03 PREDIABETES: ICD-10-CM

## 2025-01-27 DIAGNOSIS — M25.511 CHRONIC PAIN OF BOTH SHOULDERS: ICD-10-CM

## 2025-01-27 DIAGNOSIS — F41.1 GENERALIZED ANXIETY DISORDER: ICD-10-CM

## 2025-01-27 DIAGNOSIS — I10 ESSENTIAL HYPERTENSION WITH GOAL BLOOD PRESSURE LESS THAN 140/90: ICD-10-CM

## 2025-01-27 DIAGNOSIS — E78.5 HYPERLIPIDEMIA LDL GOAL <100: ICD-10-CM

## 2025-01-27 DIAGNOSIS — G89.29 CHRONIC PAIN OF BOTH SHOULDERS: ICD-10-CM

## 2025-01-27 DIAGNOSIS — M25.512 CHRONIC PAIN OF BOTH SHOULDERS: ICD-10-CM

## 2025-01-27 DIAGNOSIS — Z00.00 ENCOUNTER FOR MEDICARE ANNUAL WELLNESS EXAM: Primary | ICD-10-CM

## 2025-01-27 DIAGNOSIS — R15.2 FECAL URGENCY: ICD-10-CM

## 2025-01-27 LAB
EST. AVERAGE GLUCOSE BLD GHB EST-MCNC: 117 MG/DL
HBA1C MFR BLD: 5.7 % (ref 0–5.6)

## 2025-01-27 PROCEDURE — G2211 COMPLEX E/M VISIT ADD ON: HCPCS | Performed by: FAMILY MEDICINE

## 2025-01-27 PROCEDURE — 83036 HEMOGLOBIN GLYCOSYLATED A1C: CPT | Performed by: FAMILY MEDICINE

## 2025-01-27 PROCEDURE — 80048 BASIC METABOLIC PNL TOTAL CA: CPT | Performed by: FAMILY MEDICINE

## 2025-01-27 PROCEDURE — 36415 COLL VENOUS BLD VENIPUNCTURE: CPT | Performed by: FAMILY MEDICINE

## 2025-01-27 PROCEDURE — G0439 PPPS, SUBSEQ VISIT: HCPCS | Performed by: FAMILY MEDICINE

## 2025-01-27 PROCEDURE — 80061 LIPID PANEL: CPT | Performed by: FAMILY MEDICINE

## 2025-01-27 PROCEDURE — 99214 OFFICE O/P EST MOD 30 MIN: CPT | Mod: 25 | Performed by: FAMILY MEDICINE

## 2025-01-27 RX ORDER — ATORVASTATIN CALCIUM 10 MG/1
10 TABLET, FILM COATED ORAL DAILY
Qty: 90 TABLET | Refills: 3 | Status: SHIPPED | OUTPATIENT
Start: 2025-01-27

## 2025-01-27 RX ORDER — AMLODIPINE BESYLATE 10 MG/1
10 TABLET ORAL DAILY
Qty: 90 TABLET | Refills: 1 | Status: SHIPPED | OUTPATIENT
Start: 2025-01-27

## 2025-01-27 RX ORDER — VALSARTAN AND HYDROCHLOROTHIAZIDE 160; 25 MG/1; MG/1
1 TABLET ORAL DAILY
Qty: 90 TABLET | Refills: 1 | Status: SHIPPED | OUTPATIENT
Start: 2025-01-27

## 2025-01-27 RX ORDER — PAROXETINE 20 MG/1
20 TABLET, FILM COATED ORAL EVERY MORNING
Qty: 90 TABLET | Refills: 1 | Status: SHIPPED | OUTPATIENT
Start: 2025-01-27

## 2025-01-27 SDOH — HEALTH STABILITY: PHYSICAL HEALTH: ON AVERAGE, HOW MANY DAYS PER WEEK DO YOU ENGAGE IN MODERATE TO STRENUOUS EXERCISE (LIKE A BRISK WALK)?: 3 DAYS

## 2025-01-27 ASSESSMENT — PAIN SCALES - GENERAL: PAINLEVEL_OUTOF10: NO PAIN (0)

## 2025-01-27 ASSESSMENT — SOCIAL DETERMINANTS OF HEALTH (SDOH): HOW OFTEN DO YOU GET TOGETHER WITH FRIENDS OR RELATIVES?: TWICE A WEEK

## 2025-01-27 NOTE — PROGRESS NOTES
Preventive Care Visit  [facility]  Cira Schwab MD, Family Medicine  Jan 27, 2025      Assessment & Plan     (Z00.00) Encounter for Medicare annual wellness exam  (primary encounter diagnosis)  Comment: preventive needs reviewed   Plan: see orders in Epic.     (I10) Essential hypertension with goal blood pressure less than 140/90  Comment: to goal  Plan: amLODIPine (NORVASC) 10 MG tablet,         valsartan-hydrochlorothiazide (DIOVAN HCT)         160-25 MG tablet, Basic metabolic panel  (Ca,         Cl, CO2, Creat, Gluc, K, Na, BUN)        Continue all meds, labs today.    Refill x 6 months     (F41.1) Generalized anxiety disorder  Comment: stable, increased stress with spouse  Plan: PARoxetine (PAXIL) 20 MG tablet         Refill x 6 months     (E78.5) Hyperlipidemia LDL goal <100  Comment: due for labs  Plan: Lipid panel reflex to direct LDL Non-fasting,         atorvastatin (LIPITOR) 10 MG tablet        Refill x 1 yr     (R73.03) Prediabetes  Comment: due  Plan: Hemoglobin A1c            (M25.511,  G89.29,  M25.512) Chronic pain of both shoulders  Comment: likely OA  Plan: consider xrays, pt will reach out if she wants them  Tylenol or ibuprofen when needed    (R15.2) Fecal urgency  Comment: likely dumping syndrome after cholecystectomy  Plan: ok to continue current management with OTC med, consider imodium.  Could add colestid if needed.    The longitudinal plan of care for the diagnosis(es)/condition(s) as documented were addressed during this visit. Due to the added complexity in care, I will continue to support Jenifer in the subsequent management and with ongoing continuity of care.            Counseling  Appropriate preventive services were addressed with this patient via screening, questionnaire, or discussion as appropriate for fall prevention, nutrition, physical activity, Tobacco-use cessation, social engagement, weight loss and cognition.  Checklist reviewing preventive services  available has been given to the patient.  Reviewed patient's diet, addressing concerns and/or questions.   She is at risk for lack of exercise and has been provided with information to increase physical activity for the benefit of her well-being.   She is at risk for psychosocial distress and has been provided with information to reduce risk.   Discussed possible causes of fatigue. The patient was provided with written information regarding signs of hearing loss.           Maciel Burgess is a 79 year old, presenting for the following:  Medicare Visit        1/27/2025    10:12 AM   Additional Questions   Roomed by tarah Price - describes some dumping syndrome and urgency.  Using OTC med that helps.  + h/o cholecystectomy    Both shoulders ache at night, interfere with sleep as she is a side sleeper. Ok during the day and has adequate ROM. Left with more limited ROM than the right.    Has lost 10 pounds since last year, walking more, diet changes.          Health Care Directive  Patient does not have a Health Care Directive: Patient states has Advance Directive and will bring in a copy to clinic.      1/27/2025   General Health   How would you rate your overall physical health? Good   Feel stress (tense, anxious, or unable to sleep) To some extent   (!) STRESS CONCERN      1/27/2025   Nutrition   Diet: Regular (no restrictions)         1/27/2025   Exercise   Days per week of moderate/strenous exercise 3 days         1/27/2025   Social Factors   Frequency of gathering with friends or relatives Twice a week   Worry food won't last until get money to buy more No   Food not last or not have enough money for food? No   Do you have housing? (Housing is defined as stable permanent housing and does not include staying ouside in a car, in a tent, in an abandoned building, in an overnight shelter, or couch-surfing.) Yes   Are you worried about losing your housing? No   Lack of transportation? No   Unable to get  utilities (heat,electricity)? No         1/27/2025   Fall Risk   Fallen 2 or more times in the past year? No    No   Trouble with walking or balance? No    No       Multiple values from one day are sorted in reverse-chronological order          1/27/2025   Activities of Daily Living- Home Safety   Needs help with the following daily activites None of the above   Safety concerns in the home None of the above         1/27/2025   Dental   Dentist two times every year? Yes         1/27/2025   Hearing Screening   Hearing concerns? (!) IT'S HARD TO FOLLOW A CONVERSATION IN A NOISY RESTAURANT OR CROWDED ROOM.         1/27/2025   Driving Risk Screening   Patient/family members have concerns about driving No         1/27/2025   General Alertness/Fatigue Screening   Have you been more tired than usual lately? (!) YES         1/27/2025   Urinary Incontinence Screening   Bothered by leaking urine in past 6 months No         1/27/2025   TB Screening   Were you born outside of the [place]? No         Today's PHQ-2 Score:       1/27/2025    10:13 AM   PHQ-2 ( 1999 [employer])   Q1: Little interest or pleasure in doing things 0   Q2: Feeling down, depressed or hopeless 0   PHQ-2 Score 0    Q1: Little interest or pleasure in doing things Not at all   Q2: Feeling down, depressed or hopeless Not at all   PHQ-2 Score 0       Patient-reported           1/27/2025   Substance Use   Alcohol more than 3/day or more than 7/wk No   Do you have a current opioid prescription? No   How severe/bad is pain from 1 to 10? 8/10   Do you use any other substances recreationally? No    (!) PRESCRIPTION DRUGS    (!) XANAX OR OTHER BENZOS       Multiple values from one day are sorted in reverse-chronological order     Social History     Tobacco Use    Smoking status: Never    Smokeless tobacco: Never   Vaping Use    Vaping status: Never Used   Substance Use Topics    Alcohol use: Yes     Comment: Wine...on occasion    Drug use: No          Mammogram Screening -  After age 74- determine frequency with patient based on health status, life expectancy and patient goals    G 3 P 1   No LMP recorded. (Menstrual status: Tubal ).     Fasting: Yes    Td: tdap 8/2017       Flu: 12/2024      Covid: 12/2024      Shingrix: done      PPV: done       RSV: discussed               Cholesterol:   Lab Results   Component Value Date    CHOL 198 01/17/2024    CHOL 199 11/10/2020     Lab Results   Component Value Date    HDL 94 01/17/2024    HDL 88 11/10/2020     Lab Results   Component Value Date    LDL 89 01/17/2024    LDL 96 11/10/2020     Lab Results   Component Value Date    TRIG 73 01/17/2024    TRIG 76 11/10/2020     Lab Results   Component Value Date    CHOLHDLRATIO 3.1 08/16/2013         MMG: no further screening  Dexa:  2/2017     Flex/colo: no further screening      Seat Belt: Yes    Sunscreen use: Yes   Calcium Intake: adeq  Health Care Directive: Yes   Sexually Active: No    Current contraception: none  History of abnormal Pap smear: No  Family history of colon/breast/ovarian cancer: No  Regular self breast exam: Yes  History of abnormal mammogram: No      ASCVD Risk   The 10-year ASCVD risk score (Bertram HEIN, et al., 2019) is: 37.6%    Values used to calculate the score:      Age: 79 years      Sex: Female      Is Non- : No      Diabetic: No      Tobacco smoker: No      Systolic Blood Pressure: 137 mmHg      Is BP treated: Yes      HDL Cholesterol: 94 mg/dL      Total Cholesterol: 198 mg/dL            Reviewed and updated as needed this visit by Provider   Tobacco  Allergies  Meds  Problems  Med Hx  Surg Hx  Fam Hx            Labs reviewed in EPIC  BP Readings from Last 3 Encounters:   01/27/25 137/77   01/17/24 131/78   01/18/23 128/66    Wt Readings from Last 3 Encounters:   01/27/25 61.7 kg (136 lb)   01/17/24 66 kg (145 lb 9.6 oz)   01/18/23 71.1 kg (156 lb 12.8 oz)                  Patient Active Problem List   Diagnosis    Primary  localized osteoarthrosis of lower leg    Generalized anxiety disorder    Essential hypertension with goal blood pressure less than 140/90    Hyperlipidemia LDL goal <100    Prediabetes     Past Surgical History:   Procedure Laterality Date    APPENDECTOMY  1973    CHOLECYSTECTOMY  1973    COLONOSCOPY  2015    GALLBLADDER SURGERY      JOINT REPLACEMENT  07/17/2018    left knee    JOINT REPLACEMENT  2016    right knee    ORTHOPEDIC SURGERY  Knee replacement    Nov. 2015    TUBAL LIGATION         Social History     Tobacco Use    Smoking status: Never    Smokeless tobacco: Never   Substance Use Topics    Alcohol use: Yes     Comment: Wine...on occasion     Family History   Problem Relation Age of Onset    Cancer Mother 89        Utreine cancer    Hypertension Mother     Cerebrovascular Disease Mother     Other Cancer Mother     Depression Mother     Anxiety Disorder Mother     Arthritis Father     Cancer Father         Lung cancer    Other Cancer Father          Current Outpatient Medications   Medication Sig Dispense Refill    ALPRAZolam (XANAX) 0.25 MG tablet TAKE 1 TABLET BY MOUTH NIGHTLY AS NEEDED FOR ANXIETY. 90 tablet 0    amLODIPine (NORVASC) 10 MG tablet Take 1 tablet (10 mg) by mouth daily 90 tablet 1    aspirin 81 MG tablet Take by mouth daily      atorvastatin (LIPITOR) 10 MG tablet Take 1 tablet (10 mg) by mouth daily 90 tablet 3    Calcium-Magnesium-Vitamin D 600-300-100 LIQD       multivitamin w/minerals (THERA-VIT-M) tablet Take 1 tablet by mouth daily      NONFORMULARY 2 times daily Hemp oil      PARoxetine (PAXIL) 20 MG tablet Take 1 tablet (20 mg) by mouth every morning 90 tablet 1    valsartan-hydrochlorothiazide (DIOVAN HCT) 160-25 MG tablet Take 1 tablet by mouth daily 90 tablet 1    Vitamin D, Cholecalciferol, 1000 UNITS CAPS Take 1 capsule by mouth daily      vitamin E 400 UNIT capsule Take by mouth daily       Current providers sharing in care for this patient include:  Patient Care  "Team:  Cira Schwab MD as PCP - General (Family Practice)  Cira Schwab MD as Assigned PCP  Cristina Mora, RN as     The following health maintenance items are reviewed in Epic and correct as of today:  Health Maintenance   Topic Date Due    ANNUAL REVIEW OF HM ORDERS  Never done    RSV VACCINE (1 - 1-dose 75+ series) Never done    LIPID  01/17/2025    BMP  04/15/2025    MEDICARE ANNUAL WELLNESS VISIT  01/27/2026    FALL RISK ASSESSMENT  01/27/2026    DTAP/TDAP/TD IMMUNIZATION (3 - Td or Tdap) 08/24/2027    GLUCOSE  10/15/2027    ADVANCE CARE PLANNING  01/17/2029    DEXA  02/02/2032    HEPATITIS C SCREENING  Completed    PHQ-2 (once per calendar year)  Completed    INFLUENZA VACCINE  Completed    Pneumococcal Vaccine: 50+ Years  Completed    ZOSTER IMMUNIZATION  Completed    COVID-19 Vaccine  Completed    HPV IMMUNIZATION  Aged Out    MENINGITIS IMMUNIZATION  Aged Out    RSV MONOCLONAL ANTIBODY  Aged Out    MAMMO SCREENING  Discontinued    COLORECTAL CANCER SCREENING  Discontinued         Review of Systems  Constitutional, neuro, ENT, endocrine, pulmonary, cardiac, gastrointestinal, genitourinary, musculoskeletal, integument and psychiatric systems are negative, except as otherwise noted.     Objective    Exam  /77 (BP Location: Right arm, Patient Position: Chair, Cuff Size: Adult Large)   Pulse 88   Temp 97.6  F (36.4  C) (Oral)   Ht 1.6 m (5' 3\")   Wt 61.7 kg (136 lb)   SpO2 98%   BMI 24.09 kg/m     Estimated body mass index is 24.09 kg/m  as calculated from the following:    Height as of this encounter: 1.6 m (5' 3\").    Weight as of this encounter: 61.7 kg (136 lb).    Physical Exam  GENERAL: alert and no distress  EYES: Eyes grossly normal to inspection, PERRL and conjunctivae and sclerae normal  HENT: ear canals and TM's normal, nose and mouth without ulcers or lesions  NECK: no adenopathy, no asymmetry, masses, or scars  RESP: lungs clear to auscultation - no rales, " rhonchi or wheezes  BREAST: normal without masses, tenderness or nipple discharge and no palpable axillary masses or adenopathy  CV: regular rate and rhythm, normal S1 S2, no S3 or S4, no murmur, click or rub, no peripheral edema  ABDOMEN: soft, nontender, no hepatosplenomegaly, no masses and bowel sounds normal  MS: no gross musculoskeletal defects noted, no edema  SKIN: no suspicious lesions or rashes  NEURO: Normal strength and tone, mentation intact and speech normal  PSYCH: mentation appears normal, affect normal/bright        1/27/2025   Mini Cog   Mini-Cog Not Completed (choose reason) Patient declines   Normal cognition based on my direct observation during interview and exam.     Patient declines, there are NO concerns for cognitive deficits.           Signed Electronically by: Cira Schwab MD

## 2025-01-27 NOTE — PATIENT INSTRUCTIONS
Patient Education   Preventive Care Advice   This is general advice given by our system to help you stay healthy. However, your care team may have specific advice just for you. Please talk to your care team about your preventive care needs.  Nutrition  Eat 5 or more servings of fruits and vegetables each day.  Try wheat bread, brown rice and whole grain pasta (instead of white bread, rice, and pasta).  Get enough calcium and vitamin D. Check the label on foods and aim for 100% of the RDA (recommended daily allowance).  Lifestyle  Exercise at least 150 minutes each week  (30 minutes a day, 5 days a week).  Do muscle strengthening activities 2 days a week. These help control your weight and prevent disease.  No smoking.  Wear sunscreen to prevent skin cancer.  Have a dental exam and cleaning every 6 months.  Yearly exams  See your health care team every year to talk about:  Any changes in your health.  Any medicines your care team has prescribed.  Preventive care, family planning, and ways to prevent chronic diseases.  Shots (vaccines)   HPV shots (up to age 26), if you've never had them before.  Hepatitis B shots (up to age 59), if you've never had them before.  COVID-19 shot: Get this shot when it's due.  Flu shot: Get a flu shot every year.  Tetanus shot: Get a tetanus shot every 10 years.  Pneumococcal, hepatitis A, and RSV shots: Ask your care team if you need these based on your risk.  Shingles shot (for age 50 and up)  General health tests  Diabetes screening:  Starting at age 35, Get screened for diabetes at least every 3 years.  If you are younger than age 35, ask your care team if you should be screened for diabetes.  Cholesterol test: At age 39, start having a cholesterol test every 5 years, or more often if advised.  Bone density scan (DEXA): At age 50, ask your care team if you should have this scan for osteoporosis (brittle bones).  Hepatitis C: Get tested at least once in your life.  STIs (sexually  transmitted infections)  Before age 24: Ask your care team if you should be screened for STIs.  After age 24: Get screened for STIs if you're at risk. You are at risk for STIs (including HIV) if:  You are sexually active with more than one person.  You don't use condoms every time.  You or a partner was diagnosed with a sexually transmitted infection.  If you are at risk for HIV, ask about PrEP medicine to prevent HIV.  Get tested for HIV at least once in your life, whether you are at risk for HIV or not.  Cancer screening tests  Cervical cancer screening: If you have a cervix, begin getting regular cervical cancer screening tests starting at age 21.  Breast cancer scan (mammogram): If you've ever had breasts, begin having regular mammograms starting at age 40. This is a scan to check for breast cancer.  Colon cancer screening: It is important to start screening for colon cancer at age 45.  Have a colonoscopy test every 10 years (or more often if you're at risk) Or, ask your provider about stool tests like a FIT test every year or Cologuard test every 3 years.  To learn more about your testing options, visit:   .  For help making a decision, visit:   https://bit.ly/yf10127.  Prostate cancer screening test: If you have a prostate, ask your care team if a prostate cancer screening test (PSA) at age 55 is right for you.  Lung cancer screening: If you are a current or former smoker ages 50 to 80, ask your care team if ongoing lung cancer screenings are right for you.  For informational purposes only. Not to replace the advice of your health care provider. Copyright   2023 [facility] [employer]. All rights reserved. Clinically reviewed by the [facility] Transitions Program. [employer] [id number] - REV 01/24.  Hearing Loss: Care Instructions  Overview     Hearing loss is a sudden or slow decrease in how well you hear. It can range from slight to profound. Permanent hearing loss can occur with aging. It also can  happen when you are exposed long-term to loud noise. Examples include listening to loud music, riding motorcycles, or being around other loud machines.  Hearing loss can affect your work and home life. It can make you feel lonely or depressed. You may feel that you have lost your independence. But hearing aids and other devices can help you hear better and feel connected to others.  Follow-up care is a key part of your treatment and safety. Be sure to make and go to all appointments, and call your doctor if you are having problems. It's also a good idea to know your test results and keep a list of the medicines you take.  How can you care for yourself at home?  Avoid loud noises whenever possible. This helps keep your hearing from getting worse.  Always wear hearing protection around loud noises.  Wear a hearing aid as directed.  A professional can help you pick a hearing aid that will work best for you.  You can also get hearing aids over the counter for mild to moderate hearing loss.  Have hearing tests as your doctor suggests. They can show whether your hearing has changed. Your hearing aid may need to be adjusted.  Use other devices as needed. These may include:  Telephone amplifiers and hearing aids that can connect to a television, stereo, radio, or microphone.  Devices that use lights or vibrations. These alert you to the doorbell, a ringing telephone, or a baby monitor.  Television closed-captioning. This shows the words at the bottom of the screen. Most new TVs can do this.  TTY (text telephone). This lets you type messages back and forth on the telephone instead of talking or listening. These devices are also called TDD. When messages are typed on the keyboard, they are sent over the phone line to a receiving TTY. The message is shown on a monitor.  Use text messaging, social media, and email if it is hard for you to communicate by telephone.  Try to learn a listening technique called speechreading. It is  "not lipreading. You pay attention to people's gestures, expressions, posture, and tone of voice. These clues can help you understand what a person is saying. Face the person you are talking to, and have them face you. Make sure the lighting is good. You need to see the other person's face clearly.  Think about counseling if you need help to adjust to your hearing loss.  When should you call for help?  Watch closely for changes in your health, and be sure to contact your doctor if:    You think your hearing is getting worse.     You have new symptoms, such as dizziness or nausea.   Where can you learn more?  Go to https://www.easy2map.net/patiented  Enter R798 in the search box to learn more about \"Hearing Loss: Care Instructions.\"  Current as of: September 27, 2023  Content Version: 14.3    2024 [employer].   Care instructions adapted under license by your healthcare professional. If you have questions about a medical condition or this instruction, always ask your healthcare professional. [employer] disclaims any warranty or liability for your use of this information.    Learning About Stress  What is stress?     Stress is your body's response to a hard situation. Your body can have a physical, emotional, or mental response. Stress is a fact of life for most people, and it affects everyone differently. What causes stress for you may not be stressful for someone else.  A lot of things can cause stress. You may feel stress when you go on a job interview, take a test, or run a race. This kind of short-term stress is normal and even useful. It can help you if you need to work hard or react quickly. For example, stress can help you finish an important job on time.  Long-term stress is caused by ongoing stressful situations or events. Examples of long-term stress include long-term health problems, ongoing problems at work, or conflicts in your family. Long-term stress can harm your health.  How " does stress affect your health?  When you are stressed, your body responds as though you are in danger. It makes hormones that speed up your heart, make you breathe faster, and give you a burst of energy. This is called the fight-or-flight stress response. If the stress is over quickly, your body goes back to normal and no harm is done.  But if stress happens too often or lasts too long, it can have bad effects. Long-term stress can make you more likely to get sick, and it can make symptoms of some diseases worse. If you tense up when you are stressed, you may develop neck, shoulder, or low back pain. Stress is linked to high blood pressure and heart disease.  Stress also harms your emotional health. It can make you santiago, tense, or depressed. Your relationships may suffer, and you may not do well at work or school.  What can you do to manage stress?  You can try these things to help manage stress:   Do something active. Exercise or activity can help reduce stress. Walking is a great way to get started. Even everyday activities such as housecleaning or yard work can help.  Try yoga or negrita chi. These techniques combine exercise and meditation. You may need some training at first to learn them.  Do something you enjoy. For example, listen to music or go to a movie. Practice your hobby or do volunteer work.  Meditate. This can help you relax, because you are not worrying about what happened before or what may happen in the future.  Do guided imagery. Imagine yourself in any setting that helps you feel calm. You can use online videos, books, or a teacher to guide you.  Do breathing exercises. For example:  From a standing position, bend forward from the waist with your knees slightly bent. Let your arms dangle close to the floor.  Breathe in slowly and deeply as you return to a standing position. Roll up slowly and lift your head last.  Hold your breath for just a few seconds in the standing position.  Breathe out  "slowly and bend forward from the waist.  Let your feelings out. Talk, laugh, cry, and express anger when you need to. Talking with supportive friends or family, a counselor, or a [place] leader about your feelings is a healthy way to relieve stress. Avoid discussing your feelings with people who make you feel worse.  Write. It may help to write about things that are bothering you. This helps you find out how much stress you feel and what is causing it. When you know this, you can find better ways to cope.  What can you do to prevent stress?  You might try some of these things to help prevent stress:  Manage your time. This helps you find time to do the things you want and need to do.  Get enough sleep. Your body recovers from the stresses of the day while you are sleeping.  Get support. Your family, friends, and community can make a difference in how you experience stress.  Limit your news feed. Avoid or limit time on social media or news that may make you feel stressed.  Do something active. Exercise or activity can help reduce stress. Walking is a great way to get started.  Where can you learn more?  Go to https://www.[employer].net/patiented  Enter N032 in the search box to learn more about \"Learning About Stress.\"  Current as of: October 24, 2023  Content Version: 14.3    2024 [employer].   Care instructions adapted under license by your healthcare professional. If you have questions about a medical condition or this instruction, always ask your healthcare professional. [employer] disclaims any warranty or liability for your use of this information.    Learning About Sleeping Well  What does sleeping well mean?     Sleeping well means getting enough sleep to feel good and stay healthy. How much sleep is enough varies among people.  The number of hours you sleep and how you feel when you wake up are both important. If you do not feel refreshed, you probably need more sleep. Another sign of " "not getting enough sleep is feeling tired during the day.  Experts recommend that adults get at least 7 or more hours of sleep per day. Children and older adults need more sleep.  Why is getting enough sleep important?  Getting enough quality sleep is a basic part of good health. When your sleep suffers, your physical health, mood, and your thoughts can suffer too. You may find yourself feeling more grumpy or stressed. Not getting enough sleep also can lead to serious problems, including injury, accidents, anxiety, and depression.  What might cause poor sleeping?  Many things can cause sleep problems, including:  Changes to your sleep schedule.  Stress. Stress can be caused by fear about a single event, such as giving a speech. Or you may have ongoing stress, such as worry about work or school.  Depression, anxiety, and other mental or emotional conditions.  Changes in your sleep habits or surroundings. This includes changes that happen where you sleep, such as noise, light, or sleeping in a different bed. It also includes changes in your sleep pattern, such as having jet lag or working a late shift.  Health problems, such as pain, breathing problems, and restless legs syndrome.  Lack of regular exercise.  Using alcohol, nicotine, or caffeine before bed.  How can you help yourself?  Here are some tips that may help you sleep more soundly and wake up feeling more refreshed.  Your sleeping area   Use your bedroom only for sleeping and sex. A bit of light reading may help you fall asleep. But if it doesn't, do your reading elsewhere in the house. Try not to use your TV, computer, smartphone, or tablet while you are in bed.  Be sure your bed is big enough to stretch out comfortably, especially if you have a sleep partner.  Keep your bedroom quiet, dark, and cool. Use curtains, blinds, or a sleep mask to block out light. To block out noise, use earplugs, soothing music, or a \"white noise\" machine.  Your evening and " "bedtime routine   Create a relaxing bedtime routine. You might want to take a warm shower or bath, or listen to soothing music.  Go to bed at the same time every night. And get up at the same time every morning, even if you feel tired.  What to avoid   Limit caffeine (coffee, tea, caffeinated sodas) during the day, and don't have any for at least 6 hours before bedtime.  Avoid drinking alcohol before bedtime. Alcohol can cause you to wake up more often during the night.  Try not to smoke or use tobacco, especially in the evening. Nicotine can keep you awake.  Limit naps during the day, especially close to bedtime.  Avoid lying in bed awake for too long. If you can't fall asleep or if you wake up in the middle of the night and can't get back to sleep within about 20 minutes, get out of bed and go to another room until you feel sleepy.  Avoid taking medicine right before bed that may keep you awake or make you feel hyper or energized. Your doctor can tell you if your medicine may do this and if you can take it earlier in the day.  If you can't sleep   Imagine yourself in a peaceful, pleasant scene. Focus on the details and feelings of being in a place that is relaxing.  Get up and do a quiet or boring activity until you feel sleepy.  Avoid drinking any liquids before going to bed to help prevent waking up often to use the bathroom.  Where can you learn more?  Go to https://www.[employer].net/patiented  Enter J942 in the search box to learn more about \"Learning About Sleeping Well.\"  Current as of: July 31, 2024  Content Version: 14.3    2024 [employer].   Care instructions adapted under license by your healthcare professional. If you have questions about a medical condition or this instruction, always ask your healthcare professional. [employer] disclaims any warranty or liability for your use of this information.    Substance Use Disorder: Care Instructions  Overview     You can improve your " life and health by stopping your use of alcohol or drugs. When you don't drink or use drugs, you may feel and sleep better. You may get along better with your family, friends, and coworkers. There are medicines and programs that can help with substance use disorder.  How can you care for yourself at home?  Here are some ways to help you stay sober and prevent relapse.  If you have been given medicine to help keep you sober or reduce your cravings, be sure to take it exactly as prescribed.  Talk to your doctor about programs that can help you stop using drugs or drinking alcohol.  Do not keep alcohol or drugs in your home.  Plan ahead. Think about what you'll say if other people ask you to drink or use drugs. Try not to spend time with people who drink or use drugs.  Use the time and money spent on drinking or drugs to do something that's important to you.  Preventing a relapse  Have a plan to deal with relapse. Learn to recognize changes in your thinking that lead you to drink or use drugs. Get help before you start to drink or use drugs again.  Try to stay away from situations, friends, or places that may lead you to drink or use drugs.  If you feel the need to drink alcohol or use drugs again, seek help right away. Call a trusted friend or family member. Some people get support from organizations such as Narcotics Anonymous or [employer] or from treatment facilities.  If you relapse, get help as soon as you can. Some people make a plan with another person that outlines what they want that person to do for them if they relapse. The plan usually includes how to handle the relapse and who to notify in case of relapse.  Don't give up. Remember that a relapse doesn't mean that you have failed. Use the experience to learn the triggers that lead you to drink or use drugs. Then quit again. Recovery is a lifelong process. Many people have several relapses before they are able to quit for good.  Follow-up care is a key  "part of your treatment and safety. Be sure to make and go to all appointments, and call your doctor if you are having problems. It's also a good idea to know your test results and keep a list of the medicines you take.  When should you call for help?   Call 911  anytime you think you may need emergency care. For example, call if you or someone else:    Has overdosed or has withdrawal signs. Be sure to tell the emergency workers that you are or someone else is using or trying to quit using drugs. Overdose or withdrawal signs may include:  Losing consciousness.  Seizure.  Seeing or hearing things that aren't there (hallucinations).     Is thinking or talking about suicide or harming others.   Where to get help 24 hours a day, 7 days a week   If you or someone you know talks about suicide, self-harm, a mental health crisis, a substance use crisis, or any other kind of emotional distress, get help right away. You can:    Call the Suicide and Crisis Lifeline at 988.     Call 9-652-135-PUON (1-151.974.1452).     Text HOME to 737828 to access the Crisis Text Line.   Consider saving these numbers in your phone.  Go to [employer] for more information or to chat online.  Call your doctor now or seek immediate medical care if:    You are having withdrawal symptoms. These may include nausea or vomiting, sweating, shakiness, and anxiety.   Watch closely for changes in your health, and be sure to contact your doctor if:    You have a relapse.     You need more help or support to stop.   Where can you learn more?  Go to https://www.[employer].net/patiented  Enter H573 in the search box to learn more about \"Substance Use Disorder: Care Instructions.\"  Current as of: November 15, 2023  Content Version: 14.3    2024 [employer].   Care instructions adapted under license by your healthcare professional. If you have questions about a medical condition or this instruction, always ask your healthcare professional. [employer] disclaims any warranty or liability for your use of this information.

## 2025-01-28 LAB
ANION GAP SERPL CALCULATED.3IONS-SCNC: 15 MMOL/L (ref 7–15)
BUN SERPL-MCNC: 17.1 MG/DL (ref 8–23)
CALCIUM SERPL-MCNC: 9.6 MG/DL (ref 8.8–10.4)
CHLORIDE SERPL-SCNC: 100 MMOL/L (ref 98–107)
CHOLEST SERPL-MCNC: 180 MG/DL
CREAT SERPL-MCNC: 0.79 MG/DL (ref 0.51–0.95)
EGFRCR SERPLBLD CKD-EPI 2021: 76 ML/MIN/1.73M2
FASTING STATUS PATIENT QL REPORTED: YES
FASTING STATUS PATIENT QL REPORTED: YES
GLUCOSE SERPL-MCNC: 116 MG/DL (ref 70–99)
HCO3 SERPL-SCNC: 27 MMOL/L (ref 22–29)
HDLC SERPL-MCNC: 98 MG/DL
LDLC SERPL CALC-MCNC: 68 MG/DL
NONHDLC SERPL-MCNC: 82 MG/DL
POTASSIUM SERPL-SCNC: 3.7 MMOL/L (ref 3.4–5.3)
SODIUM SERPL-SCNC: 142 MMOL/L (ref 135–145)
TRIGL SERPL-MCNC: 68 MG/DL

## 2025-02-25 ENCOUNTER — MYC MEDICAL ADVICE (OUTPATIENT)
Dept: FAMILY MEDICINE | Facility: CLINIC | Age: 80
End: 2025-02-25
Payer: COMMERCIAL

## 2025-02-25 DIAGNOSIS — K58.0 IRRITABLE BOWEL SYNDROME WITH DIARRHEA: Primary | ICD-10-CM

## 2025-02-25 RX ORDER — DIPHENOXYLATE HYDROCHLORIDE AND ATROPINE SULFATE 2.5; .025 MG/1; MG/1
1 TABLET ORAL 4 TIMES DAILY PRN
Qty: 30 TABLET | Refills: 1 | Status: SHIPPED | OUTPATIENT
Start: 2025-02-25

## 2025-03-03 DIAGNOSIS — F41.1 GENERALIZED ANXIETY DISORDER: ICD-10-CM

## 2025-03-03 RX ORDER — ALPRAZOLAM 0.25 MG
TABLET ORAL
Qty: 90 TABLET | Refills: 0 | Status: SHIPPED | OUTPATIENT
Start: 2025-03-03

## 2025-07-29 DIAGNOSIS — I10 ESSENTIAL HYPERTENSION WITH GOAL BLOOD PRESSURE LESS THAN 140/90: ICD-10-CM

## 2025-07-29 RX ORDER — VALSARTAN AND HYDROCHLOROTHIAZIDE 160; 25 MG/1; MG/1
1 TABLET ORAL DAILY
Qty: 90 TABLET | Refills: 1 | Status: SHIPPED | OUTPATIENT
Start: 2025-07-29

## 2025-07-30 DIAGNOSIS — F41.1 GENERALIZED ANXIETY DISORDER: ICD-10-CM

## 2025-07-30 DIAGNOSIS — I10 ESSENTIAL HYPERTENSION WITH GOAL BLOOD PRESSURE LESS THAN 140/90: Primary | ICD-10-CM

## 2025-07-30 RX ORDER — ALPRAZOLAM 0.25 MG
0.25 TABLET ORAL
Qty: 90 TABLET | Refills: 0 | Status: SHIPPED | OUTPATIENT
Start: 2025-07-30

## 2025-08-03 DIAGNOSIS — I10 ESSENTIAL HYPERTENSION WITH GOAL BLOOD PRESSURE LESS THAN 140/90: ICD-10-CM

## 2025-08-03 RX ORDER — AMLODIPINE BESYLATE 10 MG/1
10 TABLET ORAL DAILY
Qty: 90 TABLET | Refills: 1 | Status: SHIPPED | OUTPATIENT
Start: 2025-08-03

## 2025-08-31 DIAGNOSIS — F41.1 GENERALIZED ANXIETY DISORDER: ICD-10-CM

## 2025-08-31 DIAGNOSIS — I10 ESSENTIAL HYPERTENSION WITH GOAL BLOOD PRESSURE LESS THAN 140/90: Primary | ICD-10-CM

## 2025-09-01 ENCOUNTER — MYC MEDICAL ADVICE (OUTPATIENT)
Dept: FAMILY MEDICINE | Facility: CLINIC | Age: 80
End: 2025-09-01
Payer: COMMERCIAL

## 2025-09-01 RX ORDER — PAROXETINE 20 MG/1
20 TABLET, FILM COATED ORAL EVERY MORNING
Qty: 90 TABLET | Refills: 1 | Status: SHIPPED | OUTPATIENT
Start: 2025-09-01